# Patient Record
Sex: MALE | Race: BLACK OR AFRICAN AMERICAN | NOT HISPANIC OR LATINO
[De-identification: names, ages, dates, MRNs, and addresses within clinical notes are randomized per-mention and may not be internally consistent; named-entity substitution may affect disease eponyms.]

---

## 2021-12-23 ENCOUNTER — TRANSCRIPTION ENCOUNTER (OUTPATIENT)
Age: 71
End: 2021-12-23

## 2024-01-30 PROBLEM — Z00.00 ENCOUNTER FOR PREVENTIVE HEALTH EXAMINATION: Status: ACTIVE | Noted: 2024-01-30

## 2024-02-07 ENCOUNTER — APPOINTMENT (OUTPATIENT)
Dept: MRI IMAGING | Facility: CLINIC | Age: 74
End: 2024-02-07
Payer: MEDICARE

## 2024-02-07 ENCOUNTER — OUTPATIENT (OUTPATIENT)
Dept: OUTPATIENT SERVICES | Facility: HOSPITAL | Age: 74
LOS: 1 days | End: 2024-02-07
Payer: MEDICARE

## 2024-02-07 DIAGNOSIS — Z00.00 ENCOUNTER FOR GENERAL ADULT MEDICAL EXAMINATION WITHOUT ABNORMAL FINDINGS: ICD-10-CM

## 2024-02-07 PROCEDURE — 70551 MRI BRAIN STEM W/O DYE: CPT | Mod: MH

## 2024-02-07 PROCEDURE — 70551 MRI BRAIN STEM W/O DYE: CPT | Mod: 26,MH

## 2024-02-27 ENCOUNTER — EMERGENCY (EMERGENCY)
Facility: HOSPITAL | Age: 74
LOS: 1 days | Discharge: HOME | End: 2024-02-27
Attending: EMERGENCY MEDICINE
Payer: MEDICARE

## 2024-02-27 VITALS
HEART RATE: 70 BPM | OXYGEN SATURATION: 98 % | RESPIRATION RATE: 18 BRPM | TEMPERATURE: 98 F | SYSTOLIC BLOOD PRESSURE: 150 MMHG | DIASTOLIC BLOOD PRESSURE: 88 MMHG

## 2024-02-27 VITALS
SYSTOLIC BLOOD PRESSURE: 133 MMHG | RESPIRATION RATE: 20 BRPM | HEART RATE: 71 BPM | OXYGEN SATURATION: 100 % | WEIGHT: 182.1 LBS | TEMPERATURE: 98 F | HEIGHT: 72 IN | DIASTOLIC BLOOD PRESSURE: 74 MMHG

## 2024-02-27 LAB
ALBUMIN SERPL ELPH-MCNC: 4.6 G/DL — SIGNIFICANT CHANGE UP (ref 3.3–5)
ALP SERPL-CCNC: 95 U/L — SIGNIFICANT CHANGE UP (ref 40–120)
ALT FLD-CCNC: 22 U/L — SIGNIFICANT CHANGE UP (ref 10–45)
ANION GAP SERPL CALC-SCNC: 12 MMOL/L — SIGNIFICANT CHANGE UP (ref 5–17)
APPEARANCE UR: CLEAR — SIGNIFICANT CHANGE UP
AST SERPL-CCNC: 26 U/L — SIGNIFICANT CHANGE UP (ref 10–40)
BACTERIA # UR AUTO: NEGATIVE /HPF — SIGNIFICANT CHANGE UP
BASOPHILS # BLD AUTO: 0.09 K/UL — SIGNIFICANT CHANGE UP (ref 0–0.2)
BASOPHILS NFR BLD AUTO: 0.8 % — SIGNIFICANT CHANGE UP (ref 0–2)
BILIRUB SERPL-MCNC: 0.3 MG/DL — SIGNIFICANT CHANGE UP (ref 0.2–1.2)
BILIRUB UR-MCNC: NEGATIVE — SIGNIFICANT CHANGE UP
BUN SERPL-MCNC: 14 MG/DL — SIGNIFICANT CHANGE UP (ref 7–23)
CALCIUM SERPL-MCNC: 9.7 MG/DL — SIGNIFICANT CHANGE UP (ref 8.4–10.5)
CAST: 0 /LPF — SIGNIFICANT CHANGE UP (ref 0–4)
CHLORIDE SERPL-SCNC: 105 MMOL/L — SIGNIFICANT CHANGE UP (ref 96–108)
CO2 SERPL-SCNC: 23 MMOL/L — SIGNIFICANT CHANGE UP (ref 22–31)
COLOR SPEC: YELLOW — SIGNIFICANT CHANGE UP
CREAT SERPL-MCNC: 1.17 MG/DL — SIGNIFICANT CHANGE UP (ref 0.5–1.3)
DIFF PNL FLD: ABNORMAL
EGFR: 66 ML/MIN/1.73M2 — SIGNIFICANT CHANGE UP
EOSINOPHIL # BLD AUTO: 0.05 K/UL — SIGNIFICANT CHANGE UP (ref 0–0.5)
EOSINOPHIL NFR BLD AUTO: 0.5 % — SIGNIFICANT CHANGE UP (ref 0–6)
GLUCOSE SERPL-MCNC: 116 MG/DL — HIGH (ref 70–99)
GLUCOSE UR QL: NEGATIVE MG/DL — SIGNIFICANT CHANGE UP
HCT VFR BLD CALC: 42.6 % — SIGNIFICANT CHANGE UP (ref 39–50)
HGB BLD-MCNC: 14.2 G/DL — SIGNIFICANT CHANGE UP (ref 13–17)
IMM GRANULOCYTES NFR BLD AUTO: 0.5 % — SIGNIFICANT CHANGE UP (ref 0–0.9)
KETONES UR-MCNC: NEGATIVE MG/DL — SIGNIFICANT CHANGE UP
LEUKOCYTE ESTERASE UR-ACNC: NEGATIVE — SIGNIFICANT CHANGE UP
LIDOCAIN IGE QN: 43 U/L — SIGNIFICANT CHANGE UP (ref 7–60)
LYMPHOCYTES # BLD AUTO: 1.15 K/UL — SIGNIFICANT CHANGE UP (ref 1–3.3)
LYMPHOCYTES # BLD AUTO: 10.7 % — LOW (ref 13–44)
MCHC RBC-ENTMCNC: 29.2 PG — SIGNIFICANT CHANGE UP (ref 27–34)
MCHC RBC-ENTMCNC: 33.3 GM/DL — SIGNIFICANT CHANGE UP (ref 32–36)
MCV RBC AUTO: 87.7 FL — SIGNIFICANT CHANGE UP (ref 80–100)
MONOCYTES # BLD AUTO: 0.54 K/UL — SIGNIFICANT CHANGE UP (ref 0–0.9)
MONOCYTES NFR BLD AUTO: 5 % — SIGNIFICANT CHANGE UP (ref 2–14)
NEUTROPHILS # BLD AUTO: 8.83 K/UL — HIGH (ref 1.8–7.4)
NEUTROPHILS NFR BLD AUTO: 82.5 % — HIGH (ref 43–77)
NITRITE UR-MCNC: NEGATIVE — SIGNIFICANT CHANGE UP
NRBC # BLD: 0 /100 WBCS — SIGNIFICANT CHANGE UP (ref 0–0)
PH UR: 7.5 — SIGNIFICANT CHANGE UP (ref 5–8)
PLATELET # BLD AUTO: 208 K/UL — SIGNIFICANT CHANGE UP (ref 150–400)
POTASSIUM SERPL-MCNC: 4 MMOL/L — SIGNIFICANT CHANGE UP (ref 3.5–5.3)
POTASSIUM SERPL-SCNC: 4 MMOL/L — SIGNIFICANT CHANGE UP (ref 3.5–5.3)
PROT SERPL-MCNC: 7.4 G/DL — SIGNIFICANT CHANGE UP (ref 6–8.3)
PROT UR-MCNC: SIGNIFICANT CHANGE UP MG/DL
RBC # BLD: 4.86 M/UL — SIGNIFICANT CHANGE UP (ref 4.2–5.8)
RBC # FLD: 12.2 % — SIGNIFICANT CHANGE UP (ref 10.3–14.5)
RBC CASTS # UR COMP ASSIST: 14 /HPF — HIGH (ref 0–4)
SODIUM SERPL-SCNC: 140 MMOL/L — SIGNIFICANT CHANGE UP (ref 135–145)
SP GR SPEC: 1.02 — SIGNIFICANT CHANGE UP (ref 1–1.03)
SQUAMOUS # UR AUTO: 0 /HPF — SIGNIFICANT CHANGE UP (ref 0–5)
UROBILINOGEN FLD QL: 0.2 MG/DL — SIGNIFICANT CHANGE UP (ref 0.2–1)
WBC # BLD: 10.71 K/UL — HIGH (ref 3.8–10.5)
WBC # FLD AUTO: 10.71 K/UL — HIGH (ref 3.8–10.5)
WBC UR QL: 1 /HPF — SIGNIFICANT CHANGE UP (ref 0–5)

## 2024-02-27 PROCEDURE — 96375 TX/PRO/DX INJ NEW DRUG ADDON: CPT

## 2024-02-27 PROCEDURE — 99284 EMERGENCY DEPT VISIT MOD MDM: CPT | Mod: 25

## 2024-02-27 PROCEDURE — 85025 COMPLETE CBC W/AUTO DIFF WBC: CPT

## 2024-02-27 PROCEDURE — 87086 URINE CULTURE/COLONY COUNT: CPT

## 2024-02-27 PROCEDURE — 99285 EMERGENCY DEPT VISIT HI MDM: CPT | Mod: FS

## 2024-02-27 PROCEDURE — 81001 URINALYSIS AUTO W/SCOPE: CPT

## 2024-02-27 PROCEDURE — 96374 THER/PROPH/DIAG INJ IV PUSH: CPT

## 2024-02-27 PROCEDURE — 36415 COLL VENOUS BLD VENIPUNCTURE: CPT

## 2024-02-27 PROCEDURE — 83690 ASSAY OF LIPASE: CPT

## 2024-02-27 PROCEDURE — 80053 COMPREHEN METABOLIC PANEL: CPT

## 2024-02-27 PROCEDURE — 74176 CT ABD & PELVIS W/O CONTRAST: CPT | Mod: 26,MC

## 2024-02-27 PROCEDURE — 74176 CT ABD & PELVIS W/O CONTRAST: CPT | Mod: MC

## 2024-02-27 RX ORDER — ONDANSETRON 8 MG/1
4 TABLET, FILM COATED ORAL ONCE
Refills: 0 | Status: COMPLETED | OUTPATIENT
Start: 2024-02-27 | End: 2024-02-27

## 2024-02-27 RX ORDER — MORPHINE SULFATE 50 MG/1
4 CAPSULE, EXTENDED RELEASE ORAL ONCE
Refills: 0 | Status: DISCONTINUED | OUTPATIENT
Start: 2024-02-27 | End: 2024-02-27

## 2024-02-27 RX ORDER — SODIUM CHLORIDE 9 MG/ML
500 INJECTION INTRAMUSCULAR; INTRAVENOUS; SUBCUTANEOUS ONCE
Refills: 0 | Status: COMPLETED | OUTPATIENT
Start: 2024-02-27 | End: 2024-02-27

## 2024-02-27 RX ORDER — OXYCODONE HYDROCHLORIDE 5 MG/1
1 TABLET ORAL
Qty: 4 | Refills: 0
Start: 2024-02-27

## 2024-02-27 RX ORDER — KETOROLAC TROMETHAMINE 30 MG/ML
15 SYRINGE (ML) INJECTION ONCE
Refills: 0 | Status: DISCONTINUED | OUTPATIENT
Start: 2024-02-27 | End: 2024-02-27

## 2024-02-27 RX ADMIN — Medication 15 MILLIGRAM(S): at 18:53

## 2024-02-27 RX ADMIN — SODIUM CHLORIDE 500 MILLILITER(S): 9 INJECTION INTRAMUSCULAR; INTRAVENOUS; SUBCUTANEOUS at 17:02

## 2024-02-27 RX ADMIN — ONDANSETRON 4 MILLIGRAM(S): 8 TABLET, FILM COATED ORAL at 17:06

## 2024-02-27 RX ADMIN — MORPHINE SULFATE 4 MILLIGRAM(S): 50 CAPSULE, EXTENDED RELEASE ORAL at 17:02

## 2024-02-27 NOTE — ED PROVIDER NOTE - NSFOLLOWUPINSTRUCTIONS_ED_ALL_ED_FT
Rest and hydration. motrin 400mg every 6-8 hours for pain as needed. Take with food. Oxycodone 5 mg every 12 hours for severe pain only. DO NOT DRINK ALCOHOL OR DRIVE ON MEDICATION. follow up with urology this week. Return immediately for worsening pain, vomiting or fever.

## 2024-02-27 NOTE — ED PROVIDER NOTE - PATIENT PORTAL LINK FT
You can access the FollowMyHealth Patient Portal offered by [facility] by registering at the following website: [email or web address]. By joining [employer]’s FollowMyHealth portal, you will also be able to view your health information using other applications (apps) compatible with our system.

## 2024-02-27 NOTE — ED ADULT NURSE NOTE - OBJECTIVE STATEMENT
Patient is a 73y male presenting to the ED ambulatory from home with c/o abdominal pain. Patient A&Ox4. Patient reports developing left sided flank pain earlier today, states pain is "severe" and feels similar to a past episode of kidney stones. Pain is accompanied by nausea and "straining" with urination. Denies chest pain, shortness of breath, fever/chills, headache, hematuria. Respirations spontaneous, even, and non-labored. Abdomen soft, non-distended and non-tender upon palpation. Displays left sided CVA tenderness.

## 2024-02-27 NOTE — ED PROVIDER NOTE - OBJECTIVE STATEMENT
74 yo M with pmhx kidney stones, htn and hld presenting with left flank pain radiating to the abdomen x 1200 today. Patient states pain is stabbing 8/10. Patient states he feels some straining with urination but denies blood. patient hasn't taken anything for his symptoms. patients last kidney stone was 13 years ago and was able to pass it by himself. Patient denies chest pain, sob, cough, fever, nvd, dysuria, hematuria, tingling, numbness, weakness, and trauma

## 2024-02-27 NOTE — ED PROVIDER NOTE - CONSTITUTIONAL, MLM
normal... Well appearing, awake, alert, oriented to person, place, time/situation and mild distress due to pain

## 2024-02-27 NOTE — ED ADULT TRIAGE NOTE - HEIGHT IN FEET
6 Area H Indication Text: Tumors in this location are included in Area H (eyelids, eyebrows, nose, lips, chin, ear, pre-auricular, post-auricular, temple, genitalia, hands, feet, ankles and areola).  Tissue conservation is critical in these anatomic locations.

## 2024-02-27 NOTE — ED ADULT TRIAGE NOTE - CHIEF COMPLAINT QUOTE
left lower abdominal pain radiating to back today left lower abdominal pain radiating to back today, history of kidney stone

## 2024-02-27 NOTE — ED PROVIDER NOTE - CLINICAL SUMMARY MEDICAL DECISION MAKING FREE TEXT BOX
72 yo M with pmhx kidney stones, htn and hld presenting with left flank pain radiating to the abdomen x 1200 today. Patient with left flank pain. no abdominal tendnress. +CVAT. Will ro stone vs pyelonephritis. 74 yo M with pmhx kidney stones, htn and hld presenting with left flank pain radiating to the abdomen x 1200 today. Patient with left flank pain. no abdominal tendnress. +CVAT. Will ro stone vs pyelonephritis.    Jing: 73 year old male with kidney sstones, htn, hld, here with left flank pain radiating to abdomen today.  no abdominal tenderness. history of stones. PE: att exam: patient awake alert NAD . LUNGS CTAB no wheeze no crackle. CARD RRR no m/r/g.  Abdomen soft NT ND no rebound no guarding, + L CVA tenderness. EXT WWP no edema no calf tenderness CV 2+DP/PT bilaterally. neuro A&Ox3 gait normal.  skin warm and dry no rash  Plan: will get imaging, labs, pain control, r/o stone versus pyelo, versus uti versus msk strain.

## 2024-02-28 LAB
CULTURE RESULTS: SIGNIFICANT CHANGE UP
SPECIMEN SOURCE: SIGNIFICANT CHANGE UP

## 2025-02-18 ENCOUNTER — APPOINTMENT (OUTPATIENT)
Dept: MRI IMAGING | Facility: CLINIC | Age: 75
End: 2025-02-18
Payer: MEDICARE

## 2025-02-18 ENCOUNTER — OUTPATIENT (OUTPATIENT)
Dept: OUTPATIENT SERVICES | Facility: HOSPITAL | Age: 75
LOS: 1 days | End: 2025-02-18
Payer: MEDICARE

## 2025-02-18 DIAGNOSIS — Z00.8 ENCOUNTER FOR OTHER GENERAL EXAMINATION: ICD-10-CM

## 2025-02-18 PROCEDURE — 70551 MRI BRAIN STEM W/O DYE: CPT | Mod: 26

## 2025-02-18 PROCEDURE — 70551 MRI BRAIN STEM W/O DYE: CPT

## 2025-04-09 ENCOUNTER — TRANSCRIPTION ENCOUNTER (OUTPATIENT)
Age: 75
End: 2025-04-09

## 2025-04-09 ENCOUNTER — INPATIENT (INPATIENT)
Facility: HOSPITAL | Age: 75
LOS: 3 days | Discharge: HOME | End: 2025-04-13
Attending: HOSPITALIST | Admitting: HOSPITALIST
Payer: MEDICARE

## 2025-04-09 VITALS
OXYGEN SATURATION: 99 % | DIASTOLIC BLOOD PRESSURE: 67 MMHG | RESPIRATION RATE: 17 BRPM | HEART RATE: 87 BPM | WEIGHT: 182.1 LBS | SYSTOLIC BLOOD PRESSURE: 139 MMHG | HEIGHT: 72 IN | TEMPERATURE: 99 F

## 2025-04-09 DIAGNOSIS — R31.9 HEMATURIA, UNSPECIFIED: ICD-10-CM

## 2025-04-09 DIAGNOSIS — Z87.438 PERSONAL HISTORY OF OTHER DISEASES OF MALE GENITAL ORGANS: ICD-10-CM

## 2025-04-09 DIAGNOSIS — Z29.9 ENCOUNTER FOR PROPHYLACTIC MEASURES, UNSPECIFIED: ICD-10-CM

## 2025-04-09 DIAGNOSIS — N40.0 BENIGN PROSTATIC HYPERPLASIA WITHOUT LOWER URINARY TRACT SYMPTOMS: ICD-10-CM

## 2025-04-09 DIAGNOSIS — R78.81 BACTEREMIA: ICD-10-CM

## 2025-04-09 DIAGNOSIS — N17.9 ACUTE KIDNEY FAILURE, UNSPECIFIED: ICD-10-CM

## 2025-04-09 DIAGNOSIS — E78.5 HYPERLIPIDEMIA, UNSPECIFIED: ICD-10-CM

## 2025-04-09 DIAGNOSIS — I10 ESSENTIAL (PRIMARY) HYPERTENSION: ICD-10-CM

## 2025-04-09 LAB
ALBUMIN SERPL ELPH-MCNC: 4.2 G/DL — SIGNIFICANT CHANGE UP (ref 3.3–5)
ALP SERPL-CCNC: 66 U/L — SIGNIFICANT CHANGE UP (ref 40–120)
ALT FLD-CCNC: 17 U/L — SIGNIFICANT CHANGE UP (ref 4–41)
ANION GAP SERPL CALC-SCNC: 14 MMOL/L — SIGNIFICANT CHANGE UP (ref 7–14)
APPEARANCE UR: ABNORMAL
AST SERPL-CCNC: 25 U/L — SIGNIFICANT CHANGE UP (ref 4–40)
BACTERIA # UR AUTO: NEGATIVE /HPF — SIGNIFICANT CHANGE UP
BASOPHILS # BLD AUTO: 0.15 K/UL — SIGNIFICANT CHANGE UP (ref 0–0.2)
BASOPHILS NFR BLD AUTO: 1.7 % — SIGNIFICANT CHANGE UP (ref 0–2)
BILIRUB SERPL-MCNC: 0.8 MG/DL — SIGNIFICANT CHANGE UP (ref 0.2–1.2)
BILIRUB UR-MCNC: NEGATIVE — SIGNIFICANT CHANGE UP
BLOOD GAS VENOUS COMPREHENSIVE RESULT: SIGNIFICANT CHANGE UP
BUN SERPL-MCNC: 18 MG/DL — SIGNIFICANT CHANGE UP (ref 7–23)
CALCIUM SERPL-MCNC: 9 MG/DL — SIGNIFICANT CHANGE UP (ref 8.4–10.5)
CAST: 0 /LPF — SIGNIFICANT CHANGE UP (ref 0–4)
CHLORIDE SERPL-SCNC: 102 MMOL/L — SIGNIFICANT CHANGE UP (ref 98–107)
CO2 SERPL-SCNC: 20 MMOL/L — LOW (ref 22–31)
COLOR SPEC: ABNORMAL
CREAT SERPL-MCNC: 1.59 MG/DL — HIGH (ref 0.5–1.3)
DIFF PNL FLD: ABNORMAL
EGFR: 46 ML/MIN/1.73M2 — LOW
EGFR: 46 ML/MIN/1.73M2 — LOW
EOSINOPHIL # BLD AUTO: 0 K/UL — SIGNIFICANT CHANGE UP (ref 0–0.5)
EOSINOPHIL NFR BLD AUTO: 0 % — SIGNIFICANT CHANGE UP (ref 0–6)
GLUCOSE SERPL-MCNC: 105 MG/DL — HIGH (ref 70–99)
GLUCOSE UR QL: NEGATIVE MG/DL — SIGNIFICANT CHANGE UP
HCT VFR BLD CALC: 39.4 % — SIGNIFICANT CHANGE UP (ref 39–50)
HGB BLD-MCNC: 13 G/DL — SIGNIFICANT CHANGE UP (ref 13–17)
IANC: 8.06 K/UL — HIGH (ref 1.8–7.4)
KETONES UR-MCNC: ABNORMAL MG/DL
LEUKOCYTE ESTERASE UR-ACNC: ABNORMAL
LYMPHOCYTES # BLD AUTO: 0.47 K/UL — LOW (ref 1–3.3)
LYMPHOCYTES # BLD AUTO: 5.3 % — LOW (ref 13–44)
MANUAL SMEAR VERIFICATION: SIGNIFICANT CHANGE UP
MCHC RBC-ENTMCNC: 29.1 PG — SIGNIFICANT CHANGE UP (ref 27–34)
MCHC RBC-ENTMCNC: 33 G/DL — SIGNIFICANT CHANGE UP (ref 32–36)
MCV RBC AUTO: 88.1 FL — SIGNIFICANT CHANGE UP (ref 80–100)
MONOCYTES # BLD AUTO: 0.39 K/UL — SIGNIFICANT CHANGE UP (ref 0–0.9)
MONOCYTES NFR BLD AUTO: 4.4 % — SIGNIFICANT CHANGE UP (ref 2–14)
NEUTROPHILS # BLD AUTO: 7.81 K/UL — HIGH (ref 1.8–7.4)
NEUTROPHILS NFR BLD AUTO: 87.7 % — HIGH (ref 43–77)
NITRITE UR-MCNC: NEGATIVE — SIGNIFICANT CHANGE UP
PH UR: 6 — SIGNIFICANT CHANGE UP (ref 5–8)
PLAT MORPH BLD: NORMAL — SIGNIFICANT CHANGE UP
PLATELET # BLD AUTO: 152 K/UL — SIGNIFICANT CHANGE UP (ref 150–400)
PLATELET COUNT - ESTIMATE: NORMAL — SIGNIFICANT CHANGE UP
POTASSIUM SERPL-MCNC: 4.7 MMOL/L — SIGNIFICANT CHANGE UP (ref 3.5–5.3)
POTASSIUM SERPL-SCNC: 4.7 MMOL/L — SIGNIFICANT CHANGE UP (ref 3.5–5.3)
PROT SERPL-MCNC: 7.1 G/DL — SIGNIFICANT CHANGE UP (ref 6–8.3)
PROT UR-MCNC: 100 MG/DL
RBC # BLD: 4.47 M/UL — SIGNIFICANT CHANGE UP (ref 4.2–5.8)
RBC # FLD: 12.7 % — SIGNIFICANT CHANGE UP (ref 10.3–14.5)
RBC BLD AUTO: NORMAL — SIGNIFICANT CHANGE UP
RBC CASTS # UR COMP ASSIST: 913 /HPF — HIGH (ref 0–4)
REVIEW: SIGNIFICANT CHANGE UP
SODIUM SERPL-SCNC: 136 MMOL/L — SIGNIFICANT CHANGE UP (ref 135–145)
SP GR SPEC: 1.03 — SIGNIFICANT CHANGE UP (ref 1–1.03)
SQUAMOUS # UR AUTO: 0 /HPF — SIGNIFICANT CHANGE UP (ref 0–5)
UROBILINOGEN FLD QL: 1 MG/DL — SIGNIFICANT CHANGE UP (ref 0.2–1)
VARIANT LYMPHS # BLD: 0.9 % — SIGNIFICANT CHANGE UP (ref 0–6)
VARIANT LYMPHS NFR BLD MANUAL: 0.9 % — SIGNIFICANT CHANGE UP (ref 0–6)
WBC # BLD: 8.91 K/UL — SIGNIFICANT CHANGE UP (ref 3.8–10.5)
WBC # FLD AUTO: 8.91 K/UL — SIGNIFICANT CHANGE UP (ref 3.8–10.5)
WBC UR QL: 110 /HPF — HIGH (ref 0–5)

## 2025-04-09 PROCEDURE — 99223 1ST HOSP IP/OBS HIGH 75: CPT

## 2025-04-09 PROCEDURE — 71046 X-RAY EXAM CHEST 2 VIEWS: CPT | Mod: 26

## 2025-04-09 PROCEDURE — 99285 EMERGENCY DEPT VISIT HI MDM: CPT

## 2025-04-09 RX ORDER — ACETAMINOPHEN 500 MG/5ML
650 LIQUID (ML) ORAL EVERY 6 HOURS
Refills: 0 | Status: DISCONTINUED | OUTPATIENT
Start: 2025-04-09 | End: 2025-04-13

## 2025-04-09 RX ORDER — ASPIRIN 325 MG
81 TABLET ORAL DAILY
Refills: 0 | Status: DISCONTINUED | OUTPATIENT
Start: 2025-04-09 | End: 2025-04-13

## 2025-04-09 RX ORDER — ROSUVASTATIN CALCIUM 5 MG/1
1 TABLET, FILM COATED ORAL
Refills: 0 | DISCHARGE

## 2025-04-09 RX ORDER — PIPERACILLIN-TAZO-DEXTROSE,ISO 3.375G/5
3.38 IV SOLUTION, PIGGYBACK PREMIX FROZEN(ML) INTRAVENOUS ONCE
Refills: 0 | Status: COMPLETED | OUTPATIENT
Start: 2025-04-09 | End: 2025-04-09

## 2025-04-09 RX ORDER — PIPERACILLIN-TAZO-DEXTROSE,ISO 3.375G/5
3.38 IV SOLUTION, PIGGYBACK PREMIX FROZEN(ML) INTRAVENOUS EVERY 12 HOURS
Refills: 0 | Status: DISCONTINUED | OUTPATIENT
Start: 2025-04-09 | End: 2025-04-10

## 2025-04-09 RX ORDER — LOSARTAN POTASSIUM 100 MG/1
1 TABLET, FILM COATED ORAL
Refills: 0 | DISCHARGE

## 2025-04-09 RX ORDER — INFLUENZA A VIRUS A/IDAHO/07/2018 (H1N1) ANTIGEN (MDCK CELL DERIVED, PROPIOLACTONE INACTIVATED, INFLUENZA A VIRUS A/INDIANA/08/2018 (H3N2) ANTIGEN (MDCK CELL DERIVED, PROPIOLACTONE INACTIVATED), INFLUENZA B VIRUS B/SINGAPORE/INFTT-16-0610/2016 ANTIGEN (MDCK CELL DERIVED, PROPIOLACTONE INACTIVATED), INFLUENZA B VIRUS B/IOWA/06/2017 ANTIGEN (MDCK CELL DERIVED, PROPIOLACTONE INACTIVATED) 15; 15; 15; 15 UG/.5ML; UG/.5ML; UG/.5ML; UG/.5ML
0.5 INJECTION, SUSPENSION INTRAMUSCULAR ONCE
Refills: 0 | Status: DISCONTINUED | OUTPATIENT
Start: 2025-04-09 | End: 2025-04-13

## 2025-04-09 RX ORDER — AMLODIPINE BESYLATE 10 MG/1
1 TABLET ORAL
Refills: 0 | DISCHARGE

## 2025-04-09 RX ORDER — ASPIRIN 325 MG
1 TABLET ORAL
Refills: 0 | DISCHARGE

## 2025-04-09 RX ORDER — ROSUVASTATIN CALCIUM 5 MG/1
5 TABLET, FILM COATED ORAL AT BEDTIME
Refills: 0 | Status: DISCONTINUED | OUTPATIENT
Start: 2025-04-09 | End: 2025-04-13

## 2025-04-09 RX ADMIN — Medication 200 GRAM(S): at 15:47

## 2025-04-09 RX ADMIN — Medication 25 GRAM(S): at 23:32

## 2025-04-09 RX ADMIN — Medication 1000 MILLILITER(S): at 15:47

## 2025-04-09 RX ADMIN — Medication 650 MILLIGRAM(S): at 21:04

## 2025-04-09 NOTE — H&P ADULT - PROBLEM SELECTOR PLAN 5
Home medications: Losartan 50mg qd, Norvasc 10mg qd    - Hold Losartan iso RHIANNON  - Hold Norvasc, resume as tolerated

## 2025-04-09 NOTE — H&P ADULT - TIME BILLING
I have spent a total of 75 minutes time spent to prepare to see the patient, obtaining and reviewing history, physical examination, explaining the diagnosis, prognosis and treatment plan with the patient/family/caregiver. I also have spent the time interpreting results, and documentation as above.

## 2025-04-09 NOTE — CONSULT NOTE ADULT - SUBJECTIVE AND OBJECTIVE BOX
74M PMHx HTN and elevated PSA (4.2-4.5) s/p transrectal prostate Bx 3 days ago by private Urologist p/w vomiting and chills x1 day. Pt was given Cefdinir and Bactrim x3days, started 1 day before Prostate Bx and completed 1 day ago. Pt initially went to [facility] ED, CT A&P W were unremarkable, Cr 1.52, UA only notable for small Leuk and NO nitrate. But then was contacted by [facility] ED to go back to ED due to BCx (+) GNR. Pt report MRI Prostate was unremarkable but prostate Bx was done due to FamHx CaP in brother. Pt also had some dysuria, gross hematuria, and mild straining after Bx. Otherwise denies abd discomfort, diarrhea/constipation, weak stream/incomplete bladder emptying, incontinence/leakage.       PAST MEDICAL & SURGICAL HISTORY:  HTN    FAMILY HISTORY:  CaP in brother    SOCIAL HISTORY  Social ETOH.  NO Smoking / illict drug.     MEDICATIONS  (STANDING):    MEDICATIONS  (PRN):    Allergies  Valium (Other)    Intolerances      REVIEW OF SYSTEMS: Pertinent positives and negatives as stated in HPI, otherwise negative    Vital signs  T(C): 36.5 (25 @ 18:34), Max: 37.3 (25 @ 14:49)  HR: 67 (25 @ 18:34)  BP: 140/61 (25 @ 18:34)  SpO2: 100% (25 @ 18:34)  Wt(kg): --    PHYSICAL EXAM  GENERAL: AAOx3, NAD, well appearing, speak complete sentences  HEENT: NC/AT, EOMI, anicteric, moist mucous membrane. No facial droop. Conjunctiva clear.  NECK: supple, Full ROM  RESP: NRE, no accessory muscle use.  ABD: soft, no TTP, no Rebound tenderness/Guarding/Rigidity.  BACK: no CVAT b/l, No midline or paraspinal tenderness.   EXTREMITIES: Full ROM, no b/l LE edema      LABS:  CBC                       13.0   8.91  )-----------( 152      ( 2025 15:25 )             39.4     BMP       136  |  102  |  18  ----------------------------<  105[H]  4.7   |  20[L]  |  1.59[H]    Ca    9.0      2025 15:25    TPro  7.1  /  Alb  4.2  /  TBili  0.8  /  DBili  x   /  AST  25  /  ALT  17  /  AlkPhos  66  04-09        Urinalysis Basic - ( 2025 17:29 )    Color: Orange / Appearance: Cloudy / S.028 / pH: x  Gluc: x / Ketone: Trace mg/dL  / Bili: Negative / Urobili: 1.0 mg/dL   Blood: x / Protein: 100 mg/dL / Nitrite: Negative   Leuk Esterase: Moderate / RBC: 913 /HPF /  /HPF   Sq Epi: x / Non Sq Epi: 0 /HPF / Bacteria: Negative /HPF      Urine Cx: PENDING  Blood Cx: PENDING    Radiology: NONE.  74M PMHx HTN and elevated PSA (4.2-4.5) s/p transrectal prostate Bx 3 days ago by private Urologist p/w vomiting and chills x2 day. Pt was given Cefdinir and Bactrim x3days, started 1 day before Prostate Bx and completed 1 day ago. Pt initially went to [facility] ED yesterday, CT A&P W were unremarkable, Cr 1.52, UA only notable for small Leuk and NO nitrate. But then was contacted by [facility] ED to go back to ED due to BCx (+) GNR. Pt report MRI Prostate was unremarkable but prostate Bx was done due to FamHx CaP in brother. Pt also had some dysuria, gross hematuria, and mild straining after Bx. Otherwise denies abd discomfort, diarrhea/constipation, weak stream/incomplete bladder emptying, incontinence/leakage.       PAST MEDICAL & SURGICAL HISTORY:  HTN    FAMILY HISTORY:  CaP in brother    SOCIAL HISTORY  Social ETOH.  NO Smoking / illict drug.     MEDICATIONS  (STANDING):    MEDICATIONS  (PRN):    Allergies  Valium (Other)    Intolerances      REVIEW OF SYSTEMS: Pertinent positives and negatives as stated in HPI, otherwise negative    Vital signs  T(C): 36.5 (25 @ 18:34), Max: 37.3 (25 @ 14:49)  HR: 67 (25 @ 18:34)  BP: 140/61 (25 @ 18:34)  SpO2: 100% (25 @ 18:34)  Wt(kg): --    PHYSICAL EXAM  GENERAL: AAOx3, NAD, well appearing, speak complete sentences  HEENT: NC/AT, EOMI, anicteric, moist mucous membrane. No facial droop. Conjunctiva clear.  NECK: supple, Full ROM  RESP: NRE, no accessory muscle use.  ABD: soft, no TTP, no Rebound tenderness/Guarding/Rigidity.  BACK: no CVAT b/l, No midline or paraspinal tenderness.   EXTREMITIES: Full ROM, no b/l LE edema      LABS:  CBC                       13.0   8.91  )-----------( 152      ( 2025 15:25 )             39.4     BMP       136  |  102  |  18  ----------------------------<  105[H]  4.7   |  20[L]  |  1.59[H]    Ca    9.0      2025 15:25    TPro  7.1  /  Alb  4.2  /  TBili  0.8  /  DBili  x   /  AST  25  /  ALT  17  /  AlkPhos  66  04-09        Urinalysis Basic - ( 2025 17:29 )    Color: Orange / Appearance: Cloudy / S.028 / pH: x  Gluc: x / Ketone: Trace mg/dL  / Bili: Negative / Urobili: 1.0 mg/dL   Blood: x / Protein: 100 mg/dL / Nitrite: Negative   Leuk Esterase: Moderate / RBC: 913 /HPF /  /HPF   Sq Epi: x / Non Sq Epi: 0 /HPF / Bacteria: Negative /HPF      Urine Cx: PENDING  Blood Cx: PENDING    Radiology: NONE.

## 2025-04-09 NOTE — ED PROVIDER NOTE - CLINICAL SUMMARY MEDICAL DECISION MAKING FREE TEXT BOX
75yo M ho htn presents to ED aftrer being called by [facility] about + blood cultures. pt had prostate biopsy 3 days ago, last nigt started to have vomiting and chills. was seen in [facility] ED and had labwork done as well as CT, labs and imaging were negative except for small LE on UA . pt is currently on cefdinir and  bactrim as ppx from prostate biopsy. pt received call today that his blood culutres were + for GNR and to go tot ED. pt is slightly better today, has nausea, no vomiting, did not measure his temps but less chills. denies other complaint at this time. did not take any tylenol or motrin at this tiem    GNR bacteremia after prostate biopsy, will check labs, cultures, abx, tba

## 2025-04-09 NOTE — H&P ADULT - NSICDXPASTMEDICALHX_GEN_ALL_CORE_FT
PAST MEDICAL HISTORY:  History of BPH     History of elevated PSA     HLD (hyperlipidemia)     HTN (hypertension)

## 2025-04-09 NOTE — PATIENT PROFILE ADULT - FALL HARM RISK - HARM RISK INTERVENTIONS

## 2025-04-09 NOTE — H&P ADULT - ASSESSMENT
74M with PMH HTN, BPH, pre-DM, nephrolithiasis, recent prostate biopsy (4/7/25) presenting after blood cultures from [facility] ED visit 4/8/25 showed gram-negative bacteremia. Pt underwent prostate biopsy on 4/7 without reported complications; was started on antibiotics following procedure (Bactrim, Cefdinir). On 4/8 presenting to [facility] following development of nausea/vomiting and chills after eating salmon for lunch. Given recent procedure, CT scan of abdomen/pelvis was performed which, per documentation, showed "no acute finding, no abscess/hematoma, mild stranding compatible with recent biopsy"; UA also performed which showed small LE. Pt was discharged home from the ED and received call from [facility] informing him that blood cultures were positive for gram negative rods, prompting presentation to [facility] today.    ED Course: TMax 99.1, HR 60s-80s, BP stable (-140), 99% on RA. Labs - CBC overall unremarkable, WBC wnl; CMP with Bicarb 20, Cr 1.59 (unknown baseline), VBG unremarkable with lactate 1.9. UA  with mod LE, 110 WBC, RBCs. Repeat blood cultures drawn. S/p 2L NS, Zosyn 3.375g x1. Admitted to medicine for further management.      NOTE IS INCOMPLETE 74M with PMH HTN, BPH, elevated PSA, pre-DM, nephrolithiasis p/w hematuria, dysuria iso recent transrectal prostate biopsy (4/7/25) with gram-negative bacteremia on 4/8/25 blood cultures ([facility] ED), admitted to medicine for further management. Urology following 74M with PMH HTN, BPH, elevated PSA, pre-DM, nephrolithiasis p/w hematuria, dysuria iso recent transrectal prostate biopsy (4/7/25) with gram-negative bacteremia on 4/8/25 blood cultures (from [facility] ED), admitted to medicine for further management. Urology following

## 2025-04-09 NOTE — PHARMACOTHERAPY INTERVENTION NOTE - COMMENTS
Medication history is complete. Medication list updated in Outpatient Medication Record (OMR) via [facility] and patient who provided medication bottles.     To Note  -Patient recently completed 3 day course of Cefdinir 300mg 1 capsule twice daily and Bactrim DS 1 tablet twice daily (started 4/6/2025)    Home Medications:  aspirin 81 mg oral tablet: 1 tab(s) orally once a day  losartan 50 mg oral tablet: 1 tab(s) orally once a day   Norvasc 10 mg oral tablet: 1 tab(s) orally once a day   rosuvastatin 10 mg oral tablet: 1 tab(s) orally once a day     Please call spectra [phone] if you have any questions.

## 2025-04-09 NOTE — ED ADULT TRIAGE NOTE - CHIEF COMPLAINT QUOTE
Pt c/o vomiting, chills after prostate biopsy 3 days ago. Pt discharged from [facility] yesterday. Called back today to return for +blood cultures. PHx HTN
6

## 2025-04-09 NOTE — H&P ADULT - HISTORY OF PRESENT ILLNESS
74M with PMH HTN, BPH, pre-DM, nephrolithiasis, recent prostate biopsy (4/7/25) presenting after blood cultures from [facility] ED visit 4/8/25 showed gram-negative bacteremia. Pt underwent prostate biopsy on 4/7 without reported complications; was started on antibiotics following procedure (Bactrim, Cefdinir). On 4/8 presenting to [facility] following development of nausea/vomiting and chills after eating salmon for lunch. Given recent procedure, CT scan of abdomen/pelvis was performed which, per documentation, showed "no acute finding, no abscess/hematoma, mild stranding compatible with recent biopsy"; UA also performed which showed small LE. Pt was discharged home from the ED and received call from [facility] informing him that blood cultures were positive for gram negative rods, prompting presentation to [facility] today.    ED Course: TMax 99.1, HR 60s-80s, BP stable (-140), 99% on RA. Labs - CBC overall unremarkable, WBC wnl; CMP with Bicarb 20, Cr 1.59 (unknown baseline), VBG unremarkable with lactate 1.9. UA  with mod LE, 110 WBC, RBCs. Repeat blood cultures drawn. S/p 2L NS, Zosyn 3.375g x1. Admitted to medicine for further management. 74M with PMH HTN, BPH, pre-DM, nephrolithiasis, recent transrectal prostate biopsy (4/7/25) presenting after blood cultures from [facility] ED visit 4/8/25 showed gram-negative bacteremia. Pt underwent prostate biopsy  with Dr. Tung Gresham on 4/7 given elevated PSA and family history of prostate cancer (brother). No reported complications; was started on antibiotics pre- and post-procedure (Bactrim, Cefdinir). On 4/8 presented to [facility] following development of nausea/vomiting and chills after eating salmon for lunch. Given recent procedure, CT scan of abdomen/pelvis was performed; per report enlarged prostate, mild stranding compatible with clinical history of prostate biopsy, no collections/abscesses/hematoma, mild presacral stranding. UA also performed which showed small LE, Cr 1.52. Pt was discharged home from the ED and received call from [facility] informing him that blood cultures were positive for gram negative rods, prompting presentation to [facility] today. At present, pt endorses hematuria and mild dysuria following procedure, as well as some chills and nausea. Otherwise, he denies headache, chest pain, shortness of breath, abdominal pain, constipation/diarrhea, known sick contacts.    ED Course: TMax 102.8, HR 60s-80s, BP stable (-140), 99% on RA. Labs - CBC overall unremarkable, WBC wnl; CMP with Bicarb 20, Cr 1.59 (baseline 1.15 per Feb 2025 outpatient labs), VBG unremarkable with lactate 1.9. UA  with mod LE, 110 WBC, RBCs. Repeat blood cultures drawn. S/p 2L NS, Zosyn 3.375g x1. Admitted to medicine for further management. 74M with PMH HTN, BPH, pre-DM, nephrolithiasis, recent transrectal prostate biopsy (4/7/25) presenting after blood cultures from [facility] ED visit 4/8/25 showed gram-negative bacteremia. Pt underwent prostate biopsy  with Dr. Tung Gresham on 4/7 given elevated PSA and family history of prostate cancer (brother). No reported complications; was started on antibiotics pre- and post-procedure (Bactrim, Cefdinir). On 4/8 presented to [facility] following development of nausea/vomiting and chills after eating salmon for lunch. Given recent procedure, CT scan of abdomen/pelvis was performed; per report enlarged prostate, mild stranding compatible with clinical history of prostate biopsy, no collections/abscesses/hematoma, mild presacral stranding. UA also performed which showed small LE; Cr 1.52. Pt was discharged home from the ED and received call from [facility] informing him that blood cultures were positive for gram negative rods, prompting presentation to [facility] today. At present, pt endorses hematuria and mild dysuria following procedure, as well as some chills and nausea. Otherwise, he denies headache, chest pain, shortness of breath, abdominal pain, constipation/diarrhea, known sick contacts.    ED Course: TMax 102.8, HR 60s-80s, BP stable (-140), 99% on RA. Labs - CBC overall unremarkable, WBC wnl; CMP with Bicarb 20, Cr 1.59 (baseline 1.15 per Feb 2025 outpatient labs), VBG unremarkable with lactate 1.9. UA  with mod LE, 110 WBC, RBCs. Repeat blood cultures drawn. S/p 2L NS, Zosyn 3.375g x1. Admitted to medicine for further management.

## 2025-04-09 NOTE — ED ADULT NURSE NOTE - CHIEF COMPLAINT QUOTE
Pt c/o vomiting, chills after prostate biopsy 3 days ago. Pt discharged from [facility] yesterday. Called back today to return for +blood cultures. PHx HTN

## 2025-04-09 NOTE — H&P ADULT - PROBLEM SELECTOR PLAN 4
S/p transrectal biopsy 4/7 for elevated PSA w/ family history (brother) of prostate cancer    - F/u biopsy results outpatient  - Urology following, appreciate further recommendations S/p transrectal biopsy 4/7 for elevated PSA w/ family history (brother) of prostate cancer    - F/u biopsy results outpatient   - Urology following, appreciate further recommendations

## 2025-04-09 NOTE — H&P ADULT - NSICDXFAMILYHX_GEN_ALL_CORE_FT
FAMILY HISTORY:  Sibling  Still living? Unknown  FHx: prostate cancer, Age at diagnosis: Age Unknown

## 2025-04-09 NOTE — CONSULT NOTE ADULT - ASSESSMENT
74M PMHx HTN and elevated PSA (4.2-4.5) s/p transrectal prostate Bx 3 days ago by private Urologist p/w vomiting and chills x1 day. Pt initially went to [facility] ED, CT A&P W unremarkable, Cr 1.52, UA only notable for small Leuk and NO nitrate. But then was contacted by [facility] ED to go back to ED due to BCx (+) GNR. AFVSS. Exam unremarkable. WBC 8.91. Hct 39.4. Cr 1.59. LA 1.9. UA moderate Leuk, (-) Nitrate, 110 wbc, 913 rbc. BCX and UCX PENDING. ED Tx Zosyn.     RECOMMENDATION:   -Admit to medicine.   -F/u BCx and UCx  -Abx per primary team - started Zosyn.     DISCUSSED WITH UROLOGY ATTENDING DR. MARKS   74M PMHx HTN and elevated PSA (4.2-4.5) s/p transrectal prostate Bx 3 days ago by private Urologist p/w vomiting and chills x2 day. Pt initially went to [facility] ED yesterday, CT A&P W unremarkable, Cr 1.52, UA only notable for small Leuk and NO nitrate. But then was contacted by [facility] ED to go back to ED due to BCx (+) GNR. AFVSS. Exam unremarkable. WBC 8.91. Hct 39.4. Cr 1.59. LA 1.9. UA moderate Leuk, (-) Nitrate, 110 wbc, 913 rbc. BCX and UCX PENDING. ED Tx Zosyn.     RECOMMENDATION:   -Admit to medicine.   -F/u BCx and UCx  -Abx per primary team - started Zosyn.     DISCUSSED WITH UROLOGY ATTENDING DR. MARKS

## 2025-04-09 NOTE — ED ADULT NURSE NOTE - NSFALLUNIVINTERV_ED_ALL_ED
Bed/Stretcher in lowest position, wheels locked, appropriate side rails in place/Call bell, personal items and telephone in reach/Instruct patient to call for assistance before getting out of bed/chair/stretcher/Non-slip footwear applied when patient is off stretcher/[place] to call system/Physically safe environment - no spills, clutter or unnecessary equipment/Purposeful proactive rounding/Room/bathroom lighting operational, light cord in reach

## 2025-04-09 NOTE — ED ADULT NURSE NOTE - OBJECTIVE STATEMENT
Pt is a 74y M axo x4, ambulatory without assistive devices at baseline; presents to ED referred by MD from [facility] for nausea, chills and vomiting after being told his blood cultures were positive, s/p prostate biopsy 2 days prior to [facility] visit. Pt endorses feeling chills at this time, afebrile, vitally stable, no nausea or vomiting present, denies pain. Abdomen soft and non tender nondistended. Skin is intact, warm, and dry. PMHx HTN and HLD on oral medications and compliant.

## 2025-04-09 NOTE — H&P ADULT - ATTENDING COMMENTS
74M with PMH HTN, BPH, pre-DM, nephrolithiasis, recent transrectal prostate biopsy (4/7/25) presenting after blood cultures from [facility] ED visit 4/8/25 showed gram-negative bacteremia. Pt states he has been on oral abx since biopsy. U/A positive for pyuria, hematuria, mod leuk esterase. Pt reports hematuria, however states it is improving. Reports faxed from [facility] showing blood cultures positive for gram negative rods. Urology consulted, no acute intervention, c/w abx. Pt admitted for further management.     #Gram negative bacteremia  - likely in setting of recent prostate biopsy  - c/w IV zosyn  - f/u bcx and reports from [facility]    #RHIANNON  - Cr 1.59, baseline ~1.1. possible pre-renal vs post-obstructive  - c/w IVF  - monitor I/Os  - renal U/S if not improved  - avoid nephrotoxic agents

## 2025-04-09 NOTE — H&P ADULT - PROBLEM SELECTOR PLAN 1
Presenting as callback after [facility] blood cultures showed gram-negative bacteremia iso recent transrectal prostate biopsy (4/7/25), on Bactrim/Cefdinir pre- and post-procedure. ED visit to [facility] 4/8 for nausea/vomiting/chills- at [facility] CT scan of abdomen/pelvis read as no acute findings, no abscess/hematoma, mild stranding compatible with recent biopsy. Endorsing hematuria, mild dysuria following biopsy; suspect  source given history. On admission Tmax 102.8, WBC wnl; s/p Zosyn x1 in ED    - C/w Zosyn 3.375g q12 (dose adjusted for RHIANNON)  - F/u repeat blood cultures  - UA w/ mod LE/blood, f/u urine culture  - F/u culture data from [facility] (fax to be sent)  - Urology following, appreciate recs  - Tylenol prn for fever Presenting as callback after [facility] blood cultures showed gram-negative bacteremia iso recent transrectal prostate biopsy (4/7/25, Dr. Tung Gresham), on Bactrim/Cefdinir pre- and post-procedure. ED visit to [facility] 4/8 for nausea/vomiting/chills- at [facility] CT scan of abdomen/pelvis read as no acute findings, no abscess/hematoma, mild stranding compatible with recent biopsy. Endorsing hematuria, mild dysuria following biopsy; suspect  source given history. On admission Tmax 102.8, WBC wnl; s/p Zosyn x1 in ED    - C/w Zosyn 3.375g q12 (dose adjusted for RHIANNON)  - F/u repeat blood cultures  - UA w/ mod LE/blood, f/u urine culture  - Per report from [facility] 4/8/25: gram negative rods in aerobic/anaerobic bottles; culture in progress  - Urology following, appreciate recs  - Tylenol prn for fever

## 2025-04-09 NOTE — ED PROVIDER NOTE - OBJECTIVE STATEMENT
75yo M ho htn presents to ED aftrer being called by [facility] about + blood cultures. pt had prostate biopsy 3 days ago, last nigt started to have vomiting and chills. was seen in [facility] ED and had labwork done as well as CT, labs and imaging were negative except for small LE on UA . pt is currently on cefdinir and  bactrim as ppx from prostate biopsy. pt received call today that his blood culutres were + for GNR and to go tot ED. pt is slightly better today, has nausea, no vomiting, did not measure his temps but less chills. denies other complaint at this time. did not take any tylenol or motrin at this tiem

## 2025-04-09 NOTE — H&P ADULT - NSHPPHYSICALEXAM_GEN_ALL_CORE
GENERAL: well appearing in no acute distress, non-toxic appearing  HEAD: normocephalic, atraumatic  HEENT: normal conjunctiva, oral mucosa moist, uvula midline, no tonsilar exudates, no JVD  CARDIAC: regular rate and rhythm, normal S1S2, no appreciable murmurs  PULM: normal breath sounds, clear to ascultation bilaterally, no rales, rhonchi, wheezing  GI: Abd soft, nondistended, nontender, no rebound tenderness, no guarding, no rigidity  : no suprapubic tenderness  NEURO: no focal motor or sensory deficits, AAOx4  MSK: ROM intact, no peripheral edema, no calf tenderness b/l  SKIN: well-perfused, extremities warm, no visible rashes

## 2025-04-09 NOTE — H&P ADULT - PROBLEM SELECTOR PLAN 7
DVT ppx - hold chemical ppx iso hematuria  Diet - DASH, no lactose/dairy, no eggs, no meat  Dispo - active

## 2025-04-09 NOTE — H&P ADULT - PROBLEM SELECTOR PLAN 2
Reporting onset of hematuria since transrectal biopsy, likely iso procedure. H/H 13/39.4 on admission, hemodynamically stable    - Monitor urine   - Trend CBC  - On home aspirin (unclear indication), no other blood thinners - will c/w ASA for now

## 2025-04-09 NOTE — H&P ADULT - PROBLEM SELECTOR PLAN 3
Cr elevated to 1.59 on admission; Feb 2025 outpatient labwork with Cr 1.15 (PCP at [facility], records on phone). S/p 2L NS in ED    - C/w mIVF - LR @ 100cc/hr  - Monitor UOP  - Renally dosed Zosyn as above  - Trend SCr Cr elevated to 1.59 on admission; Feb 2025 outpatient labwork with Cr 1.15 (PCP at [facility], records on pt phone). S/p 2L NS in ED    - C/w mIVF - LR @ 100cc/hr  - Monitor UOP  - Renally dosed Zosyn as above  - Trend SCr

## 2025-04-10 ENCOUNTER — TRANSCRIPTION ENCOUNTER (OUTPATIENT)
Age: 75
End: 2025-04-10

## 2025-04-10 LAB
ALBUMIN SERPL ELPH-MCNC: 3.4 G/DL — SIGNIFICANT CHANGE UP (ref 3.3–5)
ALP SERPL-CCNC: 54 U/L — SIGNIFICANT CHANGE UP (ref 40–120)
ALT FLD-CCNC: 22 U/L — SIGNIFICANT CHANGE UP (ref 4–41)
ANION GAP SERPL CALC-SCNC: 13 MMOL/L — SIGNIFICANT CHANGE UP (ref 7–14)
AST SERPL-CCNC: 36 U/L — SIGNIFICANT CHANGE UP (ref 4–40)
BILIRUB SERPL-MCNC: 0.9 MG/DL — SIGNIFICANT CHANGE UP (ref 0.2–1.2)
BUN SERPL-MCNC: 16 MG/DL — SIGNIFICANT CHANGE UP (ref 7–23)
CALCIUM SERPL-MCNC: 8.3 MG/DL — LOW (ref 8.4–10.5)
CHLORIDE SERPL-SCNC: 102 MMOL/L — SIGNIFICANT CHANGE UP (ref 98–107)
CO2 SERPL-SCNC: 19 MMOL/L — LOW (ref 22–31)
CREAT SERPL-MCNC: 1.36 MG/DL — HIGH (ref 0.5–1.3)
CULTURE RESULTS: SIGNIFICANT CHANGE UP
EGFR: 55 ML/MIN/1.73M2 — LOW
EGFR: 55 ML/MIN/1.73M2 — LOW
GLUCOSE SERPL-MCNC: 129 MG/DL — HIGH (ref 70–99)
HCT VFR BLD CALC: 34 % — LOW (ref 39–50)
HGB BLD-MCNC: 11.5 G/DL — LOW (ref 13–17)
MAGNESIUM SERPL-MCNC: 1.8 MG/DL — SIGNIFICANT CHANGE UP (ref 1.6–2.6)
MCHC RBC-ENTMCNC: 29.3 PG — SIGNIFICANT CHANGE UP (ref 27–34)
MCHC RBC-ENTMCNC: 33.8 G/DL — SIGNIFICANT CHANGE UP (ref 32–36)
MCV RBC AUTO: 86.5 FL — SIGNIFICANT CHANGE UP (ref 80–100)
NRBC # BLD AUTO: 0 K/UL — SIGNIFICANT CHANGE UP (ref 0–0)
NRBC # FLD: 0 K/UL — SIGNIFICANT CHANGE UP (ref 0–0)
NRBC BLD AUTO-RTO: 0 /100 WBCS — SIGNIFICANT CHANGE UP (ref 0–0)
PHOSPHATE SERPL-MCNC: 1.5 MG/DL — LOW (ref 2.5–4.5)
PLATELET # BLD AUTO: 119 K/UL — LOW (ref 150–400)
POTASSIUM SERPL-MCNC: 3.8 MMOL/L — SIGNIFICANT CHANGE UP (ref 3.5–5.3)
POTASSIUM SERPL-SCNC: 3.8 MMOL/L — SIGNIFICANT CHANGE UP (ref 3.5–5.3)
PROT SERPL-MCNC: 5.7 G/DL — LOW (ref 6–8.3)
RBC # BLD: 3.93 M/UL — LOW (ref 4.2–5.8)
RBC # FLD: 12.6 % — SIGNIFICANT CHANGE UP (ref 10.3–14.5)
SODIUM SERPL-SCNC: 134 MMOL/L — LOW (ref 135–145)
SPECIMEN SOURCE: SIGNIFICANT CHANGE UP
WBC # BLD: 5.26 K/UL — SIGNIFICANT CHANGE UP (ref 3.8–10.5)
WBC # FLD AUTO: 5.26 K/UL — SIGNIFICANT CHANGE UP (ref 3.8–10.5)

## 2025-04-10 PROCEDURE — 99222 1ST HOSP IP/OBS MODERATE 55: CPT

## 2025-04-10 PROCEDURE — 93010 ELECTROCARDIOGRAM REPORT: CPT

## 2025-04-10 PROCEDURE — 99231 SBSQ HOSP IP/OBS SF/LOW 25: CPT | Mod: GC

## 2025-04-10 PROCEDURE — G0545: CPT

## 2025-04-10 PROCEDURE — 99233 SBSQ HOSP IP/OBS HIGH 50: CPT

## 2025-04-10 RX ORDER — PIPERACILLIN-TAZO-DEXTROSE,ISO 3.375G/5
3.38 IV SOLUTION, PIGGYBACK PREMIX FROZEN(ML) INTRAVENOUS EVERY 8 HOURS
Refills: 0 | Status: DISCONTINUED | OUTPATIENT
Start: 2025-04-10 | End: 2025-04-11

## 2025-04-10 RX ORDER — ONDANSETRON HCL/PF 4 MG/2 ML
4 VIAL (ML) INJECTION ONCE
Refills: 0 | Status: DISCONTINUED | OUTPATIENT
Start: 2025-04-10 | End: 2025-04-13

## 2025-04-10 RX ORDER — SOD PHOS DI, MONO/K PHOS MONO 250 MG
1 TABLET ORAL THREE TIMES A DAY
Refills: 0 | Status: COMPLETED | OUTPATIENT
Start: 2025-04-10 | End: 2025-04-10

## 2025-04-10 RX ORDER — SODIUM CHLORIDE 9 G/1000ML
1000 INJECTION, SOLUTION INTRAVENOUS
Refills: 0 | Status: DISCONTINUED | OUTPATIENT
Start: 2025-04-10 | End: 2025-04-13

## 2025-04-10 RX ADMIN — Medication 1 PACKET(S): at 21:25

## 2025-04-10 RX ADMIN — Medication 25 GRAM(S): at 21:26

## 2025-04-10 RX ADMIN — Medication 650 MILLIGRAM(S): at 21:25

## 2025-04-10 RX ADMIN — Medication 25 GRAM(S): at 13:18

## 2025-04-10 RX ADMIN — ROSUVASTATIN CALCIUM 5 MILLIGRAM(S): 5 TABLET, FILM COATED ORAL at 21:25

## 2025-04-10 RX ADMIN — Medication 81 MILLIGRAM(S): at 13:17

## 2025-04-10 RX ADMIN — Medication 1 PACKET(S): at 17:21

## 2025-04-10 RX ADMIN — Medication 650 MILLIGRAM(S): at 04:57

## 2025-04-10 RX ADMIN — Medication 1 PACKET(S): at 13:17

## 2025-04-10 RX ADMIN — Medication 650 MILLIGRAM(S): at 21:55

## 2025-04-10 NOTE — CONSULT NOTE ADULT - SUBJECTIVE AND OBJECTIVE BOX
"HPI:  74M with PMH HTN, BPH, pre-DM, nephrolithiasis, recent transrectal prostate biopsy (4/7/25) presenting after blood cultures from [facility] ED visit 4/8/25 showed gram-negative bacteremia. Pt underwent prostate biopsy  with Dr. Tung Gresham on 4/7 given elevated PSA and family history of prostate cancer (brother). No reported complications; was started on antibiotics pre- and post-procedure (Bactrim, Cefdinir). On 4/8 presented to [facility] following development of nausea/vomiting and chills after eating salmon for lunch. Given recent procedure, CT scan of abdomen/pelvis was performed; per report enlarged prostate, mild stranding compatible with clinical history of prostate biopsy, no collections/abscesses/hematoma, mild presacral stranding. UA also performed which showed small LE; Cr 1.52. Pt was discharged home from the ED and received call from [facility] informing him that blood cultures were positive for gram negative rods, prompting presentation to [facility] today. At present, pt endorses hematuria and mild dysuria following procedure, as well as some chills and nausea. Otherwise, he denies headache, chest pain, shortness of breath, abdominal pain, constipation/diarrhea, known sick contacts.    ED Course: TMax 102.8, HR 60s-80s, BP stable (-140), 99% on RA. Labs - CBC overall unremarkable, WBC wnl; CMP with Bicarb 20, Cr 1.59 (baseline 1.15 per Feb 2025 outpatient labs), VBG unremarkable with lactate 1.9. UA  with mod LE, 110 WBC, RBCs. Repeat blood cultures drawn. S/p 2L NS, Zosyn 3.375g x1. Admitted to medicine for further management. (09 Apr 2025 20:14)"    Above reviewed. 73 yo M HTN, DM, nephrolithiasis, transrectal biopsy with positive BCXs  Patient recently had prostate biopsy, after procedure developed fevers, chills, went to [facility]  Was sent home, but then cultures found positive for GNR  Called back to hospital to presented to [facility]  Patient feels somewhat improved but still feels weak  ID called for further eval    PAST MEDICAL & SURGICAL HISTORY:  History of BPH      HTN (hypertension)      HLD (hyperlipidemia)      History of elevated PSA    Allergies    Valium (Other)  lactose (Unknown)    Intolerances    ANTIMICROBIALS:  piperacillin/tazobactam IVPB.. 3.375 every 8 hours    OTHER MEDS:  acetaminophen     Tablet .. 650 milliGRAM(s) Oral every 6 hours PRN  aspirin  chewable 81 milliGRAM(s) Oral daily  influenza  Vaccine (HIGH DOSE) 0.5 milliLiter(s) IntraMuscular once  lactated ringers. 1000 milliLiter(s) IV Continuous <Continuous>  potassium phosphate / sodium phosphate Powder (PHOS-NaK) 1 Packet(s) Oral three times a day  rosuvastatin 5 milliGRAM(s) Oral at bedtime    SOCIAL HISTORY: No tobacco, no alcohol, no illicit drugs    FAMILY HISTORY:  FHx: prostate cancer (Sibling)    Drug Dosing Weight  Height (cm): 182.9 (09 Apr 2025 22:00)  Weight (kg): 82.7 (09 Apr 2025 22:00)  BMI (kg/m2): 24.7 (09 Apr 2025 22:00)  BSA (m2): 2.05 (09 Apr 2025 22:00)    PE:    Vital Signs Last 24 Hrs  T(C): 36.9 (10 Apr 2025 14:00), Max: 39.6 (10 Apr 2025 05:00)  T(F): 98.4 (10 Apr 2025 14:00), Max: 103.2 (10 Apr 2025 05:00)  HR: 67 (10 Apr 2025 14:00) (64 - 92)  BP: 111/64 (10 Apr 2025 14:00) (109/61 - 140/61)  RR: 16 (10 Apr 2025 14:00) (16 - 17)  SpO2: 100% (10 Apr 2025 14:00) (96% - 100%)    Gen: AOx3, NAD, non-toxic, pleasant  CV: S1+S2 normal, nontachycardic  Resp: Clear bilat, no resp distress, no crackles/wheezes  Abd: Soft, nontender, +BS  Ext: No LE edema, no wounds  : No Neves  IV/Skin: No thrombophlebitis  Msk: No low back pain, no arthralgias, no joint swelling  Neuro: No sensory deficits, no motor deficits    LABS:                        11.5   5.26  )-----------( 119      ( 10 Apr 2025 06:10 )             34.0     04-10    134[L]  |  102  |  16  ----------------------------<  129[H]  3.8   |  19[L]  |  1.36[H]    Ca    8.3[L]      10 Apr 2025 06:10  Phos  1.5     04-10  Mg     1.80     04-10    TPro  5.7[L]  /  Alb  3.4  /  TBili  0.9  /  DBili  x   /  AST  36  /  ALT  22  /  AlkPhos  54  04-10    Urinalysis Basic - ( 10 Apr 2025 06:10 )    Color: x / Appearance: x / SG: x / pH: x  Gluc: 129 mg/dL / Ketone: x  / Bili: x / Urobili: x   Blood: x / Protein: x / Nitrite: x   Leuk Esterase: x / RBC: x / WBC x   Sq Epi: x / Non Sq Epi: x / Bacteria: x    MICROBIOLOGY:    UA+  RADIOLOGY:      CXR    FINDINGS:  The heart size is normal.  The lungs are clear.  There is no pneumothorax or pleural effusion.    IMPRESSION:  Clear lungs.

## 2025-04-10 NOTE — PROGRESS NOTE ADULT - PROBLEM SELECTOR PLAN 7
DVT ppx - hold chemical ppx iso hematuria  Diet - DASH, no lactose/dairy, no eggs, no meat  Dispo - active DVT ppx - hold chemical ppx iso hematuria  Diet - DASH, no lactose/dairy, no eggs, no meat  Dispo - active  plan of care d/w pt and wife at bedside

## 2025-04-10 NOTE — CONSULT NOTE ADULT - SUBJECTIVE AND OBJECTIVE BOX
Patient is a 74y old  Male who presents with a chief complaint of gram negative bacteremia (10 Apr 2025 16:26)      HPI:  74M with PMH HTN, BPH, pre-DM, nephrolithiasis, recent transrectal prostate biopsy (4/7/25) presenting after blood cultures from [facility] ED visit 4/8/25 showed gram-negative bacteremia. Pt underwent prostate biopsy  with Dr. Tung Gresham on 4/7 given elevated PSA and family history of prostate cancer (brother). No reported complications; was started on antibiotics pre- and post-procedure (Bactrim, Cefdinir). On 4/8 presented to [facility] following development of nausea/vomiting and chills after eating salmon for lunch. Given recent procedure, CT scan of abdomen/pelvis was performed; per report enlarged prostate, mild stranding compatible with clinical history of prostate biopsy, no collections/abscesses/hematoma, mild presacral stranding. UA also performed which showed small LE; Cr 1.52. Pt was discharged home from the ED and received call from [facility] informing him that blood cultures were positive for gram negative rods, prompting presentation to [facility] today. At present, pt endorses hematuria and mild dysuria following procedure, as well as some chills and nausea. Otherwise, he denies headache, chest pain, shortness of breath, abdominal pain, constipation/diarrhea, known sick contacts.    ED Course: TMax 102.8, HR 60s-80s, BP stable (-140), 99% on RA. Labs - CBC overall unremarkable, WBC wnl; CMP with Bicarb 20, Cr 1.59 (baseline 1.15 per Feb 2025 outpatient labs), VBG unremarkable with lactate 1.9. UA  with mod LE, 110 WBC, RBCs. Repeat blood cultures drawn. S/p 2L NS, Zosyn 3.375g x1. Admitted to medicine for further management. (09 Apr 2025 20:14)    Feeling better now, voiding without difficulty.    PAST MEDICAL & SURGICAL HISTORY:  History of BPH      HTN (hypertension)      HLD (hyperlipidemia)      MEDICATIONS  (STANDING):  aspirin  chewable 81 milliGRAM(s) Oral daily  influenza  Vaccine (HIGH DOSE) 0.5 milliLiter(s) IntraMuscular once  lactated ringers. 1000 milliLiter(s) (100 mL/Hr) IV Continuous <Continuous>  piperacillin/tazobactam IVPB.. 3.375 Gram(s) IV Intermittent every 8 hours  potassium phosphate / sodium phosphate Powder (PHOS-NaK) 1 Packet(s) Oral three times a day  rosuvastatin 5 milliGRAM(s) Oral at bedtime    MEDICATIONS  (PRN):  acetaminophen     Tablet .. 650 milliGRAM(s) Oral every 6 hours PRN Temp greater or equal to 38C (100.4F), Mild Pain (1 - 3)  ondansetron Injectable 4 milliGRAM(s) IV Push once PRN Nausea and/or Vomiting      Allergies    Valium (Other)  lactose (Unknown)    Intolerances        SOCIAL HISTORY: No illicit drug use    FAMILY HISTORY:  FHx: prostate cancer (Sibling)        Vital Signs Last 24 Hrs  T(C): 37.1 (10 Apr 2025 18:00), Max: 39.6 (10 Apr 2025 05:00)  T(F): 98.8 (10 Apr 2025 18:00), Max: 103.2 (10 Apr 2025 05:00)  HR: 77 (10 Apr 2025 18:00) (64 - 92)  BP: 126/57 (10 Apr 2025 18:00) (109/61 - 140/61)  BP(mean): --  RR: 18 (10 Apr 2025 18:00) (16 - 18)  SpO2: 99% (10 Apr 2025 18:00) (96% - 100%)    Parameters below as of 10 Apr 2025 18:00  Patient On (Oxygen Delivery Method): room air        PHYSICAL EXAM:    Constitutional: NAD, well-developed  Neck: No JVD  Back: No CVA tenderness  Respiratory: No accessory respiratory muscle use  Abd: Soft, NT/ND      I&O's Summary    09 Apr 2025 07:01  -  10 Apr 2025 07:00  --------------------------------------------------------  IN: 840 mL / OUT: 400 mL / NET: 440 mL    10 Apr 2025 07:01  -  10 Apr 2025 17:47  --------------------------------------------------------  IN: 580 mL / OUT: 0 mL / NET: 580 mL        LABS:                        11.5   5.26  )-----------( 119      ( 10 Apr 2025 06:10 )             34.0     04-10    134[L]  |  102  |  16  ----------------------------<  129[H]  3.8   |  19[L]  |  1.36[H]    Ca    8.3[L]      10 Apr 2025 06:10  Phos  1.5     04-10  Mg     1.80     04-10    TPro  5.7[L]  /  Alb  3.4  /  TBili  0.9  /  DBili  x   /  AST  36  /  ALT  22  /  AlkPhos  54  04-10      Urinalysis Basic - ( 10 Apr 2025 06:10 )    Color: x / Appearance: x / SG: x / pH: x  Gluc: 129 mg/dL / Ketone: x  / Bili: x / Urobili: x   Blood: x / Protein: x / Nitrite: x   Leuk Esterase: x / RBC: x / WBC x   Sq Epi: x / Non Sq Epi: x / Bacteria: x

## 2025-04-10 NOTE — PROGRESS NOTE ADULT - PROBLEM SELECTOR PLAN 1
Presenting as callback after [facility] blood cultures showed gram-negative bacteremia iso recent transrectal prostate biopsy (4/7/25, Dr. Tung Gresham), on Bactrim/Cefdinir pre- and post-procedure. ED visit to [facility] 4/8 for nausea/vomiting/chills- at [facility] CT scan of abdomen/pelvis read as no acute findings, no abscess/hematoma, mild stranding compatible with recent biopsy. Endorsing hematuria, mild dysuria following biopsy; suspect  source given history. On admission Tmax 102.8, WBC wnl; s/p Zosyn x1 in ED    - C/w Zosyn 3.375g q12 (dose adjusted for RHIANNON)  - F/u repeat blood cultures  - UA w/ mod LE/blood, f/u urine culture  - Per report from [facility] 4/8/25: gram negative rods in aerobic/anaerobic bottles; culture in progress  - Urology following, appreciate recs  - Tylenol prn for fever Presenting as callback after [facility] blood cultures showed gram-negative bacteremia iso recent transrectal prostate biopsy (4/7/25, Dr. Tung Gresham), on Bactrim/Cefdinir pre- and post-procedure. ED visit to [facility] 4/8 for nausea/vomiting/chills- at [facility] CT scan of abdomen/pelvis read as no acute findings, no abscess/hematoma, mild stranding compatible with recent biopsy. Endorsing hematuria, mild dysuria following biopsy; suspect  source given history. On admission Tmax 102.8, WBC wnl; s/p Zosyn x1 in ED    - C/w Zosyn 3.375g q12 (dose adjusted for RHIANNON)  - F/u repeat blood cultures  - UA w/ mod LE/blood, f/u urine culture  - Per report from [facility] 4/8/25: gram negative rods in aerobic/anaerobic bottles; culture in progress  - Urology following, appreciate recs  - Tylenol prn for fever  -ID eval requested for  abx management ,

## 2025-04-10 NOTE — PROGRESS NOTE ADULT - SUBJECTIVE AND OBJECTIVE BOX
Patient is a 74y old  Male who presents with a chief complaint of gram negative bacteremia (10 Apr 2025 07:39)      SUBJECTIVE / OVERNIGHT EVENTS:    MEDICATIONS  (STANDING):  aspirin  chewable 81 milliGRAM(s) Oral daily  influenza  Vaccine (HIGH DOSE) 0.5 milliLiter(s) IntraMuscular once  lactated ringers. 1000 milliLiter(s) (100 mL/Hr) IV Continuous <Continuous>  piperacillin/tazobactam IVPB.. 3.375 Gram(s) IV Intermittent every 12 hours  potassium phosphate / sodium phosphate Powder (PHOS-NaK) 1 Packet(s) Oral three times a day  rosuvastatin 5 milliGRAM(s) Oral at bedtime    MEDICATIONS  (PRN):  acetaminophen     Tablet .. 650 milliGRAM(s) Oral every 6 hours PRN Temp greater or equal to 38C (100.4F), Mild Pain (1 - 3)      Vital Signs Last 24 Hrs  T(C): 37.3 (10 Apr 2025 07:15), Max: 39.6 (10 Apr 2025 05:00)  T(F): 99.1 (10 Apr 2025 07:15), Max: 103.2 (10 Apr 2025 05:00)  HR: 88 (10 Apr 2025 05:00) (67 - 92)  BP: 129/66 (10 Apr 2025 05:00) (120/64 - 140/61)  BP(mean): --  RR: 16 (10 Apr 2025 05:00) (16 - 17)  SpO2: 100% (10 Apr 2025 05:00) (96% - 100%)    Parameters below as of 10 Apr 2025 05:00  Patient On (Oxygen Delivery Method): room air      CAPILLARY BLOOD GLUCOSE        I&O's Summary    09 Apr 2025 07:01  -  10 Apr 2025 07:00  --------------------------------------------------------  IN: 840 mL / OUT: 400 mL / NET: 440 mL        PHYSICAL EXAM:  GENERAL: NAD, well-developed  HEAD:  Atraumatic, Normocephalic  EYES: EOMI, PERRLA, conjunctiva and sclera clear  NECK: Supple, No JVD  CHEST/LUNG: Clear to auscultation bilaterally; No wheeze  HEART: Regular rate and rhythm; No murmurs, rubs, or gallops  ABDOMEN: Soft, Nontender, Nondistended; Bowel sounds present  EXTREMITIES:  2+ Peripheral Pulses, No clubbing, cyanosis, or edema  PSYCH: AAOx3  NEUROLOGY: non-focal  SKIN: No rashes or lesions    LABS:                        11.5   5.26  )-----------( 119      ( 10 Apr 2025 06:10 )             34.0     04-10    134[L]  |  102  |  16  ----------------------------<  129[H]  3.8   |  19[L]  |  1.36[H]    Ca    8.3[L]      10 Apr 2025 06:10  Phos  1.5     04-10  Mg     1.80     04-10    TPro  5.7[L]  /  Alb  3.4  /  TBili  0.9  /  DBili  x   /  AST  36  /  ALT  22  /  AlkPhos  54  04-10          Urinalysis Basic - ( 10 Apr 2025 06:10 )    Color: x / Appearance: x / SG: x / pH: x  Gluc: 129 mg/dL / Ketone: x  / Bili: x / Urobili: x   Blood: x / Protein: x / Nitrite: x   Leuk Esterase: x / RBC: x / WBC x   Sq Epi: x / Non Sq Epi: x / Bacteria: x        RADIOLOGY & ADDITIONAL TESTS:    Imaging Personally Reviewed:    Consultant(s) Notes Reviewed:      Care Discussed with Consultants/Other Providers:   Patient is a 74y old  Male who presents with a chief complaint of gram negative bacteremia (10 Apr 2025 07:39)      SUBJECTIVE / OVERNIGHT EVENTS: patient seen and examined by bedside, pt alert, awake, has been febrile ,pt feeling fatigued but  denies headache, dizziness, SOB, CP, Palpitations , N/V/D, abdominal pain      MEDICATIONS  (STANDING):  aspirin  chewable 81 milliGRAM(s) Oral daily  influenza  Vaccine (HIGH DOSE) 0.5 milliLiter(s) IntraMuscular once  lactated ringers. 1000 milliLiter(s) (100 mL/Hr) IV Continuous <Continuous>  piperacillin/tazobactam IVPB.. 3.375 Gram(s) IV Intermittent every 12 hours  potassium phosphate / sodium phosphate Powder (PHOS-NaK) 1 Packet(s) Oral three times a day  rosuvastatin 5 milliGRAM(s) Oral at bedtime    MEDICATIONS  (PRN):  acetaminophen     Tablet .. 650 milliGRAM(s) Oral every 6 hours PRN Temp greater or equal to 38C (100.4F), Mild Pain (1 - 3)      Vital Signs Last 24 Hrs  T(C): 37.3 (10 Apr 2025 07:15), Max: 39.6 (10 Apr 2025 05:00)  T(F): 99.1 (10 Apr 2025 07:15), Max: 103.2 (10 Apr 2025 05:00)  HR: 88 (10 Apr 2025 05:00) (67 - 92)  BP: 129/66 (10 Apr 2025 05:00) (120/64 - 140/61)  BP(mean): --  RR: 16 (10 Apr 2025 05:00) (16 - 17)  SpO2: 100% (10 Apr 2025 05:00) (96% - 100%)    Parameters below as of 10 Apr 2025 05:00  Patient On (Oxygen Delivery Method): room air      CAPILLARY BLOOD GLUCOSE        I&O's Summary    09 Apr 2025 07:01  -  10 Apr 2025 07:00  --------------------------------------------------------  IN: 840 mL / OUT: 400 mL / NET: 440 mL        PHYSICAL EXAM:  GENERAL: NAD, well-developed, appears exhausted   HEAD:  Atraumatic, Normocephalic  EYES: EOMI, PERRLA, conjunctiva and sclera clear  NECK: Supple, No JVD  CHEST/LUNG: Clear to auscultation bilaterally; No wheeze  HEART: Regular rate and rhythm; No murmurs, rubs, or gallops  ABDOMEN: Soft, Nontender, Nondistended; Bowel sounds present, no CVA tenderness   EXTREMITIES:  2+ Peripheral Pulses, No clubbing, cyanosis, or edema  PSYCH: AAOx3  NEUROLOGY: non-focal  SKIN: No rashes or lesions    LABS:                        11.5   5.26  )-----------( 119      ( 10 Apr 2025 06:10 )             34.0     04-10    134[L]  |  102  |  16  ----------------------------<  129[H]  3.8   |  19[L]  |  1.36[H]    Ca    8.3[L]      10 Apr 2025 06:10  Phos  1.5     04-10  Mg     1.80     04-10    TPro  5.7[L]  /  Alb  3.4  /  TBili  0.9  /  DBili  x   /  AST  36  /  ALT  22  /  AlkPhos  54  04-10          Urinalysis Basic - ( 10 Apr 2025 06:10 )    Color: x / Appearance: x / SG: x / pH: x  Gluc: 129 mg/dL / Ketone: x  / Bili: x / Urobili: x   Blood: x / Protein: x / Nitrite: x   Leuk Esterase: x / RBC: x / WBC x   Sq Epi: x / Non Sq Epi: x / Bacteria: x        RADIOLOGY & ADDITIONAL TESTS:    Imaging Personally Reviewed:    Consultant(s) Notes Reviewed:  urology     Care Discussed with Consultants/Other Providers: ID

## 2025-04-10 NOTE — PROGRESS NOTE ADULT - SUBJECTIVE AND OBJECTIVE BOX
Subjective  resting. feeling okay     Objective    Vital signs  T(F): , Max: 103.2 (04-10-25 @ 05:00)  HR: 88 (04-10-25 @ 05:00)  BP: 129/66 (04-10-25 @ 05:00)  SpO2: 100% (04-10-25 @ 05:00)  Wt(kg): --    Output     OUT:    Voided (mL): 400 mL  Total OUT: 400 mL    Total NET: -400 mL          Gen: NAD  Abd: soft, nontender, nondistended  : voiding     Labs      04-09 @ 15:25    WBC 8.91  / Hct 39.4  / SCr 1.59         Urinalysis with Rflx Culture (collected 04-09-25 @ 17:29)        Urine Cx: ?  Blood Cx: ?    Imaging

## 2025-04-10 NOTE — CHART NOTE - NSCHARTNOTEFT_GEN_A_CORE
Patient is a 74M PMHx HTN and elevated PSA (4.2-4.5) s/p transrectal prostate Bx 3 days ago by private Urologist p/w vomiting and chills x2 day. Pt initially went to [facility] ED, CT A&P unremarkable, Cr 1.52, UA only notable for small Leuk and no nitrate. But then was contacted by [facility] ED to go back to ED due to BCx (+) GNR.    Patient with fever in PM 4/10. Of note, patient, Patient is a 74M PMHx HTN and elevated PSA (4.2-4.5) s/p transrectal prostate Bx 3 days ago by private Urologist p/w vomiting and chills x2 day. Pt initially went to [facility] ED, CT A&P unremarkable, Cr 1.52, UA only notable for small Leuk and no nitrate. But then was contacted by [facility] ED to go back to ED due to BCx (+) GNR. Patient with fever in PM 4/10. Tylenol 650mg PO administered. RN aware to repeat temperature in an hour. Of note, patient also spiked fever in AM of 4/10. Septic work up already ongoing since 4/9. RVP now added. Will continue to monitor patient overnight.     Overnight ACP Coverage,   Sandra Blanc NP  [id number]

## 2025-04-10 NOTE — CONSULT NOTE ADULT - ASSESSMENT
Gram Negative Bacteremia after prostate biopsy  -Follow up cultures  -Continue antibiotics as per ID  -No  interventions warranted at this time.  -Outpt follow-up for biopsy results.    Amos Jackson MD  [facility] - [facility], and [place]

## 2025-04-10 NOTE — PROGRESS NOTE ADULT - PROBLEM SELECTOR PLAN 3
Cr elevated to 1.59 on admission; Feb 2025 outpatient labwork with Cr 1.15 (PCP at [facility], records on pt phone). S/p 2L NS in ED    - C/w mIVF - LR @ 100cc/hr  - Monitor UOP  - Renally dosed Zosyn as above  - Trend SCr Cr elevated to 1.59 on admission; Feb 2025 outpatient labwork with Cr 1.15 (PCP at [facility], records on pt phone). S/p 2L NS in ED    - C/w mIVF - LR @ 100cc/hr  - Monitor UOP  - Renally dosed Zosyn as above  - cr trending down, Trend SCr  - avoid nephrotoxics

## 2025-04-11 LAB
ADD ON TEST-SPECIMEN IN LAB: SIGNIFICANT CHANGE UP
ALBUMIN SERPL ELPH-MCNC: 3.3 G/DL — SIGNIFICANT CHANGE UP (ref 3.3–5)
ALP SERPL-CCNC: 58 U/L — SIGNIFICANT CHANGE UP (ref 40–120)
ALT FLD-CCNC: 43 U/L — HIGH (ref 4–41)
ANION GAP SERPL CALC-SCNC: 10 MMOL/L — SIGNIFICANT CHANGE UP (ref 7–14)
AST SERPL-CCNC: 63 U/L — HIGH (ref 4–40)
BASOPHILS # BLD AUTO: 0.09 K/UL — SIGNIFICANT CHANGE UP (ref 0–0.2)
BASOPHILS NFR BLD AUTO: 2.6 % — HIGH (ref 0–2)
BILIRUB SERPL-MCNC: 0.9 MG/DL — SIGNIFICANT CHANGE UP (ref 0.2–1.2)
BUN SERPL-MCNC: 14 MG/DL — SIGNIFICANT CHANGE UP (ref 7–23)
BURR CELLS BLD QL SMEAR: PRESENT — SIGNIFICANT CHANGE UP
CALCIUM SERPL-MCNC: 8.2 MG/DL — LOW (ref 8.4–10.5)
CHLORIDE SERPL-SCNC: 105 MMOL/L — SIGNIFICANT CHANGE UP (ref 98–107)
CO2 SERPL-SCNC: 20 MMOL/L — LOW (ref 22–31)
CREAT SERPL-MCNC: 1.2 MG/DL — SIGNIFICANT CHANGE UP (ref 0.5–1.3)
EGFR: 64 ML/MIN/1.73M2 — SIGNIFICANT CHANGE UP
EGFR: 64 ML/MIN/1.73M2 — SIGNIFICANT CHANGE UP
EOSINOPHIL # BLD AUTO: 0 K/UL — SIGNIFICANT CHANGE UP (ref 0–0.5)
EOSINOPHIL NFR BLD AUTO: 0 % — SIGNIFICANT CHANGE UP (ref 0–6)
FLUAV AG NPH QL: SIGNIFICANT CHANGE UP
FLUBV AG NPH QL: SIGNIFICANT CHANGE UP
GIANT PLATELETS BLD QL SMEAR: PRESENT — SIGNIFICANT CHANGE UP
GLUCOSE SERPL-MCNC: 99 MG/DL — SIGNIFICANT CHANGE UP (ref 70–99)
HCT VFR BLD CALC: 36 % — LOW (ref 39–50)
HGB BLD-MCNC: 12.2 G/DL — LOW (ref 13–17)
IANC: 2.59 K/UL — SIGNIFICANT CHANGE UP (ref 1.8–7.4)
LYMPHOCYTES # BLD AUTO: 0.41 K/UL — LOW (ref 1–3.3)
LYMPHOCYTES # BLD AUTO: 12.3 % — LOW (ref 13–44)
MCHC RBC-ENTMCNC: 29 PG — SIGNIFICANT CHANGE UP (ref 27–34)
MCHC RBC-ENTMCNC: 33.9 G/DL — SIGNIFICANT CHANGE UP (ref 32–36)
MCV RBC AUTO: 85.7 FL — SIGNIFICANT CHANGE UP (ref 80–100)
MONOCYTES # BLD AUTO: 0.09 K/UL — SIGNIFICANT CHANGE UP (ref 0–0.9)
MONOCYTES NFR BLD AUTO: 2.6 % — SIGNIFICANT CHANGE UP (ref 2–14)
NEUTROPHILS # BLD AUTO: 2.7 K/UL — SIGNIFICANT CHANGE UP (ref 1.8–7.4)
NEUTROPHILS NFR BLD AUTO: 72.8 % — SIGNIFICANT CHANGE UP (ref 43–77)
NEUTS BAND # BLD: 8.8 % — HIGH (ref 0–6)
NEUTS BAND NFR BLD: 8.8 % — HIGH (ref 0–6)
PHOSPHATE SERPL-MCNC: 3.4 MG/DL — SIGNIFICANT CHANGE UP (ref 2.5–4.5)
PLAT MORPH BLD: NORMAL — SIGNIFICANT CHANGE UP
PLATELET # BLD AUTO: 134 K/UL — LOW (ref 150–400)
PLATELET COUNT - ESTIMATE: ABNORMAL
POIKILOCYTOSIS BLD QL AUTO: SLIGHT — SIGNIFICANT CHANGE UP
POTASSIUM SERPL-MCNC: 4.1 MMOL/L — SIGNIFICANT CHANGE UP (ref 3.5–5.3)
POTASSIUM SERPL-SCNC: 4.1 MMOL/L — SIGNIFICANT CHANGE UP (ref 3.5–5.3)
PROT SERPL-MCNC: 5.8 G/DL — LOW (ref 6–8.3)
RBC # BLD: 4.2 M/UL — SIGNIFICANT CHANGE UP (ref 4.2–5.8)
RBC # FLD: 12.8 % — SIGNIFICANT CHANGE UP (ref 10.3–14.5)
RBC BLD AUTO: NORMAL — SIGNIFICANT CHANGE UP
RSV RNA NPH QL NAA+NON-PROBE: SIGNIFICANT CHANGE UP
SARS-COV-2 RNA SPEC QL NAA+PROBE: SIGNIFICANT CHANGE UP
SODIUM SERPL-SCNC: 135 MMOL/L — SIGNIFICANT CHANGE UP (ref 135–145)
SOURCE RESPIRATORY: SIGNIFICANT CHANGE UP
VARIANT LYMPHS # BLD: 0.9 % — SIGNIFICANT CHANGE UP (ref 0–6)
VARIANT LYMPHS NFR BLD MANUAL: 0.9 % — SIGNIFICANT CHANGE UP (ref 0–6)
WBC # BLD: 3.31 K/UL — LOW (ref 3.8–10.5)
WBC # FLD AUTO: 3.31 K/UL — LOW (ref 3.8–10.5)

## 2025-04-11 PROCEDURE — 99232 SBSQ HOSP IP/OBS MODERATE 35: CPT

## 2025-04-11 PROCEDURE — 74177 CT ABD & PELVIS W/CONTRAST: CPT | Mod: 26

## 2025-04-11 PROCEDURE — G0545: CPT

## 2025-04-11 PROCEDURE — 99233 SBSQ HOSP IP/OBS HIGH 50: CPT

## 2025-04-11 RX ORDER — POLYETHYLENE GLYCOL 3350 17 G/17G
17 POWDER, FOR SOLUTION ORAL DAILY
Refills: 0 | Status: DISCONTINUED | OUTPATIENT
Start: 2025-04-11 | End: 2025-04-13

## 2025-04-11 RX ORDER — SENNA 187 MG
2 TABLET ORAL AT BEDTIME
Refills: 0 | Status: DISCONTINUED | OUTPATIENT
Start: 2025-04-11 | End: 2025-04-13

## 2025-04-11 RX ORDER — ERTAPENEM SODIUM 1 G/1
1000 INJECTION, POWDER, LYOPHILIZED, FOR SOLUTION INTRAMUSCULAR; INTRAVENOUS EVERY 24 HOURS
Refills: 0 | Status: DISCONTINUED | OUTPATIENT
Start: 2025-04-11 | End: 2025-04-13

## 2025-04-11 RX ORDER — ACETAMINOPHEN 500 MG/5ML
1000 LIQUID (ML) ORAL ONCE
Refills: 0 | Status: COMPLETED | OUTPATIENT
Start: 2025-04-11 | End: 2025-04-11

## 2025-04-11 RX ADMIN — Medication 81 MILLIGRAM(S): at 11:26

## 2025-04-11 RX ADMIN — Medication 400 MILLIGRAM(S): at 02:16

## 2025-04-11 RX ADMIN — Medication 650 MILLIGRAM(S): at 22:15

## 2025-04-11 RX ADMIN — Medication 650 MILLIGRAM(S): at 10:13

## 2025-04-11 RX ADMIN — ERTAPENEM SODIUM 100 MILLIGRAM(S): 1 INJECTION, POWDER, LYOPHILIZED, FOR SOLUTION INTRAMUSCULAR; INTRAVENOUS at 15:34

## 2025-04-11 RX ADMIN — Medication 650 MILLIGRAM(S): at 11:07

## 2025-04-11 RX ADMIN — Medication 25 GRAM(S): at 06:05

## 2025-04-11 RX ADMIN — Medication 1000 MILLIGRAM(S): at 02:31

## 2025-04-11 RX ADMIN — ROSUVASTATIN CALCIUM 5 MILLIGRAM(S): 5 TABLET, FILM COATED ORAL at 21:18

## 2025-04-11 NOTE — PROGRESS NOTE ADULT - SUBJECTIVE AND OBJECTIVE BOX
Patient is a 74y old  Male who presents with a chief complaint of gram negative bacteremia (10 Apr 2025 17:46)      SUBJECTIVE / OVERNIGHT EVENTS:    MEDICATIONS  (STANDING):  aspirin  chewable 81 milliGRAM(s) Oral daily  influenza  Vaccine (HIGH DOSE) 0.5 milliLiter(s) IntraMuscular once  lactated ringers. 1000 milliLiter(s) (100 mL/Hr) IV Continuous <Continuous>  piperacillin/tazobactam IVPB.. 3.375 Gram(s) IV Intermittent every 8 hours  rosuvastatin 5 milliGRAM(s) Oral at bedtime    MEDICATIONS  (PRN):  acetaminophen     Tablet .. 650 milliGRAM(s) Oral every 6 hours PRN Temp greater or equal to 38C (100.4F), Mild Pain (1 - 3)  ondansetron Injectable 4 milliGRAM(s) IV Push once PRN Nausea and/or Vomiting      Vital Signs Last 24 Hrs  T(C): 36.9 (11 Apr 2025 06:05), Max: 39 (10 Apr 2025 22:00)  T(F): 98.5 (11 Apr 2025 06:05), Max: 102.2 (10 Apr 2025 22:00)  HR: 70 (11 Apr 2025 06:05) (64 - 89)  BP: 108/57 (11 Apr 2025 06:05) (108/57 - 140/57)  BP(mean): --  RR: 18 (11 Apr 2025 06:05) (16 - 18)  SpO2: 100% (11 Apr 2025 06:05) (96% - 100%)    Parameters below as of 11 Apr 2025 06:05  Patient On (Oxygen Delivery Method): room air      CAPILLARY BLOOD GLUCOSE        I&O's Summary    10 Apr 2025 07:01  -  11 Apr 2025 07:00  --------------------------------------------------------  IN: 1020 mL / OUT: 750 mL / NET: 270 mL        PHYSICAL EXAM:  GENERAL: NAD, well-developed  HEAD:  Atraumatic, Normocephalic  EYES: EOMI, PERRLA, conjunctiva and sclera clear  NECK: Supple, No JVD  CHEST/LUNG: Clear to auscultation bilaterally; No wheeze  HEART: Regular rate and rhythm; No murmurs, rubs, or gallops  ABDOMEN: Soft, Nontender, Nondistended; Bowel sounds present  EXTREMITIES:  2+ Peripheral Pulses, No clubbing, cyanosis, or edema  PSYCH: AAOx3  NEUROLOGY: non-focal  SKIN: No rashes or lesions    LABS:                        12.2   3.31  )-----------( 134      ( 11 Apr 2025 05:20 )             36.0     04-11    135  |  105  |  14  ----------------------------<  99  4.1   |  20[L]  |  1.20    Ca    8.2[L]      11 Apr 2025 05:20  Phos  1.5     04-10  Mg     1.80     04-10    TPro  5.8[L]  /  Alb  3.3  /  TBili  0.9  /  DBili  x   /  AST  63[H]  /  ALT  43[H]  /  AlkPhos  58  04-11          Urinalysis Basic - ( 11 Apr 2025 05:20 )    Color: x / Appearance: x / SG: x / pH: x  Gluc: 99 mg/dL / Ketone: x  / Bili: x / Urobili: x   Blood: x / Protein: x / Nitrite: x   Leuk Esterase: x / RBC: x / WBC x   Sq Epi: x / Non Sq Epi: x / Bacteria: x        RADIOLOGY & ADDITIONAL TESTS:    Imaging Personally Reviewed:    Consultant(s) Notes Reviewed:      Care Discussed with Consultants/Other Providers:   Patient is a 74y old  Male who presents with a chief complaint of gram negative bacteremia (10 Apr 2025 17:46)      SUBJECTIVE / OVERNIGHT EVENTS: patient seen and examined by bedside, pt more alert, awake , was febrile to 102.2 last night , low grade fever this morning   dysuria resolving , hematuria resolved   MEDICATIONS  (STANDING):  aspirin  chewable 81 milliGRAM(s) Oral daily  influenza  Vaccine (HIGH DOSE) 0.5 milliLiter(s) IntraMuscular once  lactated ringers. 1000 milliLiter(s) (100 mL/Hr) IV Continuous <Continuous>  piperacillin/tazobactam IVPB.. 3.375 Gram(s) IV Intermittent every 8 hours  rosuvastatin 5 milliGRAM(s) Oral at bedtime    MEDICATIONS  (PRN):  acetaminophen     Tablet .. 650 milliGRAM(s) Oral every 6 hours PRN Temp greater or equal to 38C (100.4F), Mild Pain (1 - 3)  ondansetron Injectable 4 milliGRAM(s) IV Push once PRN Nausea and/or Vomiting      Vital Signs Last 24 Hrs  T(C): 36.9 (11 Apr 2025 06:05), Max: 39 (10 Apr 2025 22:00)  T(F): 98.5 (11 Apr 2025 06:05), Max: 102.2 (10 Apr 2025 22:00)  HR: 70 (11 Apr 2025 06:05) (64 - 89)  BP: 108/57 (11 Apr 2025 06:05) (108/57 - 140/57)  BP(mean): --  RR: 18 (11 Apr 2025 06:05) (16 - 18)  SpO2: 100% (11 Apr 2025 06:05) (96% - 100%)    Parameters below as of 11 Apr 2025 06:05  Patient On (Oxygen Delivery Method): room air      CAPILLARY BLOOD GLUCOSE        I&O's Summary    10 Apr 2025 07:01  -  11 Apr 2025 07:00  --------------------------------------------------------  IN: 1020 mL / OUT: 750 mL / NET: 270 mL        PHYSICAL EXAM:  GENERAL: NAD, well-developed  HEAD:  Atraumatic, Normocephalic  EYES: EOMI, PERRLA, conjunctiva and sclera clear  NECK: Supple, No JVD  CHEST/LUNG: Clear to auscultation bilaterally; No wheeze  HEART: Regular rate and rhythm; No murmurs, rubs, or gallops  ABDOMEN: Soft, Nontender, Nondistended; Bowel sounds present, no CVA tenderness   EXTREMITIES:  2+ Peripheral Pulses, No clubbing, cyanosis, or edema  PSYCH: AAOx3  NEUROLOGY: non-focal  SKIN: No rashes or lesions    LABS:                        12.2   3.31  )-----------( 134      ( 11 Apr 2025 05:20 )             36.0     04-11    135  |  105  |  14  ----------------------------<  99  4.1   |  20[L]  |  1.20    Ca    8.2[L]      11 Apr 2025 05:20  Phos  1.5     04-10  Mg     1.80     04-10    TPro  5.8[L]  /  Alb  3.3  /  TBili  0.9  /  DBili  x   /  AST  63[H]  /  ALT  43[H]  /  AlkPhos  58  04-11          Urinalysis Basic - ( 11 Apr 2025 05:20 )    Color: x / Appearance: x / SG: x / pH: x  Gluc: 99 mg/dL / Ketone: x  / Bili: x / Urobili: x   Blood: x / Protein: x / Nitrite: x   Leuk Esterase: x / RBC: x / WBC x   Sq Epi: x / Non Sq Epi: x / Bacteria: x        RADIOLOGY & ADDITIONAL TESTS:    Imaging Personally Reviewed:    Consultant(s) Notes Reviewed:  ID     Care Discussed with Consultants/Other Providers:

## 2025-04-11 NOTE — PROGRESS NOTE ADULT - PROBLEM SELECTOR PLAN 3
Cr elevated to 1.59 on admission; Feb 2025 outpatient labwork with Cr 1.15 (PCP at [facility], records on pt phone). S/p 2L NS in ED  - C/w mIVF - LR @ 100cc/hr  - Monitor UOP  - cr trending down, Trend SCr  - avoid nephrotoxics

## 2025-04-11 NOTE — PROGRESS NOTE ADULT - PROBLEM SELECTOR PLAN 1
Presenting as callback after [facility] blood cultures showed gram-negative bacteremia iso recent transrectal prostate biopsy (4/7/25, Dr. Tung Gresham), on Bactrim/Cefdinir pre- and post-procedure. ED visit to [facility] 4/8 for nausea/vomiting/chills- at [facility] CT scan of abdomen/pelvis read as no acute findings, no abscess/hematoma, mild stranding compatible with recent biopsy. Endorsing hematuria, mild dysuria following biopsy; suspect  source given history. On admission Tmax 102.8, WBC wnl; s/p Zosyn x1 in ED  pt admitted with sepsis and bacteremia   - on  Zosyn 3.375g q12 (dose adjusted for RHIANNON),  Blood cx with E coli , ID rec to change abx to Ertapenem and can be discharged on PO Cipro BID to complete 14 days course if CT scan negative for abscess   - F/u repeat blood cultures- NGTD   - UA w/ mod LE/blood, f/u urine culture also with no growth  - Urology following, appreciate recs  - Tylenol prn for fever  -ID eval requested for  abx management , recs appreciated

## 2025-04-11 NOTE — PROGRESS NOTE ADULT - PROBLEM SELECTOR PLAN 2
Reporting onset of hematuria since transrectal biopsy, likely iso procedure. H/H 13/39.4 on admission, hemodynamically stable  now resolved   - Monitor urine   - Trend CBC  - On home aspirin (unclear indication), no other blood thinners - will c/w ASA for now

## 2025-04-11 NOTE — PROGRESS NOTE ADULT - PROBLEM SELECTOR PLAN 7
DVT ppx - hold chemical ppx iso hematuria, OOB   Diet - DASH, no lactose/dairy, no eggs, no meat  Dispo - pending clinical improvement   plan of care d/w pt and wife at bedside

## 2025-04-11 NOTE — PROGRESS NOTE ADULT - SUBJECTIVE AND OBJECTIVE BOX
CC: F/U for Bacteremia    Saw/spoke to patient. Unchanged.    Allergies  Valium (Other)  lactose (Unknown)    ANTIMICROBIALS:  piperacillin/tazobactam IVPB.. 3.375 every 8 hours    PE:    Vital Signs Last 24 Hrs  T(C): 37.2 (11 Apr 2025 10:00), Max: 39 (10 Apr 2025 22:00)  T(F): 99 (11 Apr 2025 10:00), Max: 102.2 (10 Apr 2025 22:00)  HR: 72 (11 Apr 2025 10:00) (67 - 89)  BP: 118/63 (11 Apr 2025 10:00) (108/57 - 140/57)  RR: 17 (11 Apr 2025 10:00) (16 - 18)  SpO2: 97% (11 Apr 2025 10:00) (96% - 100%)    Gen: AOx3, NAD, non-toxic  CV: Nontachycardic  Resp: Breathing comfortably, RA  Abd: Soft, nontender  IV/Skin: No thrombophlebitis    LABS:                        12.2   3.31  )-----------( 134      ( 11 Apr 2025 05:20 )             36.0     04-11    135  |  105  |  14  ----------------------------<  99  4.1   |  20[L]  |  1.20    Ca    8.2[L]      11 Apr 2025 05:20  Phos  1.5     04-10  Mg     1.80     04-10    TPro  5.8[L]  /  Alb  3.3  /  TBili  0.9  /  DBili  x   /  AST  63[H]  /  ALT  43[H]  /  AlkPhos  58  04-11    Urinalysis Basic - ( 11 Apr 2025 05:20 )    Color: x / Appearance: x / SG: x / pH: x  Gluc: 99 mg/dL / Ketone: x  / Bili: x / Urobili: x   Blood: x / Protein: x / Nitrite: x   Leuk Esterase: x / RBC: x / WBC x   Sq Epi: x / Non Sq Epi: x / Bacteria: x    MICROBIOLOGY:    Clean Catch  04-09-25   <10,000 CFU/mL Normal Urogenital Mary  --  --    Blood Blood-Peripheral  04-09-25   No growth at 24 hours  --  --    Blood Blood-Peripheral  04-09-25 No growth at 24 hours  --  --    RADIOLOGY:    4/9 XR    FINDINGS:  The heart size is normal.  The lungs are clear.  There is no pneumothorax or pleural effusion.    IMPRESSION:  Clear lungs.

## 2025-04-12 ENCOUNTER — TRANSCRIPTION ENCOUNTER (OUTPATIENT)
Age: 75
End: 2025-04-12

## 2025-04-12 LAB
ADD ON TEST-SPECIMEN IN LAB: SIGNIFICANT CHANGE UP
ALBUMIN SERPL ELPH-MCNC: 3.3 G/DL — SIGNIFICANT CHANGE UP (ref 3.3–5)
ALP SERPL-CCNC: 68 U/L — SIGNIFICANT CHANGE UP (ref 40–120)
ALT FLD-CCNC: 63 U/L — HIGH (ref 4–41)
ANION GAP SERPL CALC-SCNC: 12 MMOL/L — SIGNIFICANT CHANGE UP (ref 7–14)
AST SERPL-CCNC: 80 U/L — HIGH (ref 4–40)
BASOPHILS # BLD AUTO: 0.05 K/UL — SIGNIFICANT CHANGE UP (ref 0–0.2)
BASOPHILS NFR BLD AUTO: 1.4 % — SIGNIFICANT CHANGE UP (ref 0–2)
BILIRUB DIRECT SERPL-MCNC: <0.2 MG/DL — SIGNIFICANT CHANGE UP (ref 0–0.3)
BILIRUB INDIRECT FLD-MCNC: >0.4 MG/DL — SIGNIFICANT CHANGE UP (ref 0–1)
BILIRUB SERPL-MCNC: 0.6 MG/DL — SIGNIFICANT CHANGE UP (ref 0.2–1.2)
BUN SERPL-MCNC: 12 MG/DL — SIGNIFICANT CHANGE UP (ref 7–23)
CALCIUM SERPL-MCNC: 8.7 MG/DL — SIGNIFICANT CHANGE UP (ref 8.4–10.5)
CHLORIDE SERPL-SCNC: 104 MMOL/L — SIGNIFICANT CHANGE UP (ref 98–107)
CO2 SERPL-SCNC: 21 MMOL/L — LOW (ref 22–31)
CREAT SERPL-MCNC: 1.11 MG/DL — SIGNIFICANT CHANGE UP (ref 0.5–1.3)
EGFR: 70 ML/MIN/1.73M2 — SIGNIFICANT CHANGE UP
EGFR: 70 ML/MIN/1.73M2 — SIGNIFICANT CHANGE UP
EOSINOPHIL # BLD AUTO: 0.03 K/UL — SIGNIFICANT CHANGE UP (ref 0–0.5)
EOSINOPHIL NFR BLD AUTO: 0.8 % — SIGNIFICANT CHANGE UP (ref 0–6)
GLUCOSE SERPL-MCNC: 84 MG/DL — SIGNIFICANT CHANGE UP (ref 70–99)
HCT VFR BLD CALC: 40.2 % — SIGNIFICANT CHANGE UP (ref 39–50)
HGB BLD-MCNC: 13.4 G/DL — SIGNIFICANT CHANGE UP (ref 13–17)
IANC: 2.17 K/UL — SIGNIFICANT CHANGE UP (ref 1.8–7.4)
IMM GRANULOCYTES NFR BLD AUTO: 0.3 % — SIGNIFICANT CHANGE UP (ref 0–0.9)
LYMPHOCYTES # BLD AUTO: 0.95 K/UL — LOW (ref 1–3.3)
LYMPHOCYTES # BLD AUTO: 25.7 % — SIGNIFICANT CHANGE UP (ref 13–44)
MAGNESIUM SERPL-MCNC: 2.1 MG/DL — SIGNIFICANT CHANGE UP (ref 1.6–2.6)
MCHC RBC-ENTMCNC: 29.1 PG — SIGNIFICANT CHANGE UP (ref 27–34)
MCHC RBC-ENTMCNC: 33.3 G/DL — SIGNIFICANT CHANGE UP (ref 32–36)
MCV RBC AUTO: 87.4 FL — SIGNIFICANT CHANGE UP (ref 80–100)
MONOCYTES # BLD AUTO: 0.48 K/UL — SIGNIFICANT CHANGE UP (ref 0–0.9)
MONOCYTES NFR BLD AUTO: 13 % — SIGNIFICANT CHANGE UP (ref 2–14)
NEUTROPHILS # BLD AUTO: 2.17 K/UL — SIGNIFICANT CHANGE UP (ref 1.8–7.4)
NEUTROPHILS NFR BLD AUTO: 58.8 % — SIGNIFICANT CHANGE UP (ref 43–77)
NRBC # BLD AUTO: 0 K/UL — SIGNIFICANT CHANGE UP (ref 0–0)
NRBC # FLD: 0 K/UL — SIGNIFICANT CHANGE UP (ref 0–0)
NRBC BLD AUTO-RTO: 0 /100 WBCS — SIGNIFICANT CHANGE UP (ref 0–0)
PHOSPHATE SERPL-MCNC: 3.4 MG/DL — SIGNIFICANT CHANGE UP (ref 2.5–4.5)
PLATELET # BLD AUTO: 147 K/UL — LOW (ref 150–400)
POTASSIUM SERPL-MCNC: 4.5 MMOL/L — SIGNIFICANT CHANGE UP (ref 3.5–5.3)
POTASSIUM SERPL-SCNC: 4.5 MMOL/L — SIGNIFICANT CHANGE UP (ref 3.5–5.3)
PROT SERPL-MCNC: 6 G/DL — SIGNIFICANT CHANGE UP (ref 6–8.3)
RBC # BLD: 4.6 M/UL — SIGNIFICANT CHANGE UP (ref 4.2–5.8)
RBC # FLD: 12.6 % — SIGNIFICANT CHANGE UP (ref 10.3–14.5)
SODIUM SERPL-SCNC: 137 MMOL/L — SIGNIFICANT CHANGE UP (ref 135–145)
WBC # BLD: 3.69 K/UL — LOW (ref 3.8–10.5)
WBC # FLD AUTO: 3.69 K/UL — LOW (ref 3.8–10.5)

## 2025-04-12 PROCEDURE — 99232 SBSQ HOSP IP/OBS MODERATE 35: CPT

## 2025-04-12 RX ORDER — ACETAMINOPHEN 500 MG/5ML
650 LIQUID (ML) ORAL ONCE
Refills: 0 | Status: COMPLETED | OUTPATIENT
Start: 2025-04-12 | End: 2025-04-12

## 2025-04-12 RX ADMIN — Medication 650 MILLIGRAM(S): at 02:47

## 2025-04-12 RX ADMIN — ROSUVASTATIN CALCIUM 5 MILLIGRAM(S): 5 TABLET, FILM COATED ORAL at 22:26

## 2025-04-12 RX ADMIN — Medication 650 MILLIGRAM(S): at 03:41

## 2025-04-12 RX ADMIN — Medication 650 MILLIGRAM(S): at 18:20

## 2025-04-12 RX ADMIN — ERTAPENEM SODIUM 100 MILLIGRAM(S): 1 INJECTION, POWDER, LYOPHILIZED, FOR SOLUTION INTRAMUSCULAR; INTRAVENOUS at 13:11

## 2025-04-12 RX ADMIN — Medication 650 MILLIGRAM(S): at 17:20

## 2025-04-12 RX ADMIN — Medication 81 MILLIGRAM(S): at 13:11

## 2025-04-12 NOTE — DISCHARGE NOTE PROVIDER - NSDCADMDATE_GEN_ALL_CORE_FT
09-Apr-2025 17:38 Cantharidin Counseling: Calcipotriene Counseling:  I discussed with the patient the risks of calcipotriene including but not limited to erythema, scaling, itching, and irritation. Erythromycin Counseling:  I discussed with the patient the risks of erythromycin including but not limited to GI upset, allergic reaction, drug rash, diarrhea, increase in liver enzymes, and yeast infections. Glycopyrrolate Counseling:  I discussed with the patient the risks of glycopyrrolate including but not limited to skin rash, drowsiness, dry mouth, difficulty urinating, and blurred vision. Olumiant Pregnancy And Lactation Text: Based on animal studies, Olumiant may cause embryo-fetal harm when administered to pregnant women.  The medication should not be used in pregnancy.  Breastfeeding is not recommended during treatment. Clofazimine Pregnancy And Lactation Text: This medication is Pregnancy Category C and isn't considered safe during pregnancy. It is excreted in breast milk. Bexarotene Counseling:  I discussed with the patient the risks of bexarotene including but not limited to hair loss, dry lips/skin/eyes, liver abnormalities, hyperlipidemia, pancreatitis, depression/suicidal ideation, photosensitivity, drug rash/allergic reactions, hypothyroidism, anemia, leukopenia, infection, cataracts, and teratogenicity.  Patient understands that they will need regular blood tests to check lipid profile, liver function tests, white blood cell count, thyroid function tests and pregnancy test if applicable. Clofazimine Counseling:  I discussed with the patient the risks of clofazimine including but not limited to skin and eye pigmentation, liver damage, nausea/vomiting, gastrointestinal bleeding and allergy. Acitretin Pregnancy And Lactation Text: This medication is Pregnancy Category X and should not be given to women who are pregnant or may become pregnant in the future. This medication is excreted in breast milk. Propranolol Counseling:  I discussed with the patient the risks of propranolol including but not limited to low heart rate, low blood pressure, low blood sugar, restlessness and increased cold sensitivity. They should call the office if they experience any of these side effects. Ivermectin Pregnancy And Lactation Text: This medication is Pregnancy Category C and it isn't known if it is safe during pregnancy. It is also excreted in breast milk. Griseofulvin Pregnancy And Lactation Text: This medication is Pregnancy Category X and is known to cause serious birth defects. It is unknown if this medication is excreted in breast milk but breast feeding should be avoided. Mirvaso Counseling: Mirvaso is a topical medication which can decrease superficial blood flow where applied. Side effects are uncommon and include stinging, redness and allergic reactions. Topical Clindamycin Counseling: Patient counseled that this medication may cause skin irritation or allergic reactions.  In the event of skin irritation, the patient was advised to reduce the amount of the drug applied or use it less frequently.   The patient verbalized understanding of the proper use and possible adverse effects of clindamycin.  All of the patient's questions and concerns were addressed. Colchicine Counseling:  Patient counseled regarding adverse effects including but not limited to stomach upset (nausea, vomiting, stomach pain, or diarrhea).  Patient instructed to limit alcohol consumption while taking this medication.  Colchicine may reduce blood counts especially with prolonged use.  The patient understands that monitoring of kidney function and blood counts may be required, especially at baseline. The patient verbalized understanding of the proper use and possible adverse effects of colchicine.  All of the patient's questions and concerns were addressed. Erythromycin Pregnancy And Lactation Text: This medication is Pregnancy Category B and is considered safe during pregnancy. It is also excreted in breast milk. Bexarotene Pregnancy And Lactation Text: This medication is Pregnancy Category X and should not be given to women who are pregnant or may become pregnant. This medication should not be used if you are breast feeding. Xolair Counseling:  Patient informed of potential adverse effects including but not limited to fever, muscle aches, rash and allergic reactions.  The patient verbalized understanding of the proper use and possible adverse effects of Xolair.  All of the patient's questions and concerns were addressed. Rinvoq Counseling: I discussed with the patient the risks of Rinvoq therapy including but not limited to upper respiratory tract infections, shingles, cold sores, bronchitis, nausea, cough, fever, acne, and headache. Live vaccines should be avoided.  This medication has been linked to serious infections; higher rate of mortality; malignancy and lymphoproliferative disorders; major adverse cardiovascular events; thrombosis; thrombocytopenia, anemia, and neutropenia; lipid elevations; liver enzyme elevations; and gastrointestinal perforations. Birth Control Pills Counseling: Birth Control Pill Counseling: I discussed with the patient the potential side effects of OCPs including but not limited to increased risk of stroke, heart attack, thrombophlebitis, deep venous thrombosis, hepatic adenomas, breast changes, GI upset, headaches, and depression.  The patient verbalized understanding of the proper use and possible adverse effects of OCPs. All of the patient's questions and concerns were addressed. Propranolol Pregnancy And Lactation Text: This medication is Pregnancy Category C and it isn't known if it is safe during pregnancy. It is excreted in breast milk. Mirvaso Pregnancy And Lactation Text: This medication has not been assigned a Pregnancy Risk Category. It is unknown if the medication is excreted in breast milk. Itraconazole Counseling:  I discussed with the patient the risks of itraconazole including but not limited to liver damage, nausea/vomiting, neuropathy, and severe allergy.  The patient understands that this medication is best absorbed when taken with acidic beverages such as non-diet cola or ginger ale.  The patient understands that monitoring is required including baseline LFTs and repeat LFTs at intervals.  The patient understands that they are to contact us or the primary physician if concerning signs are noted. Topical Clindamycin Pregnancy And Lactation Text: This medication is Pregnancy Category B and is considered safe during pregnancy. It is unknown if it is excreted in breast milk. Cantharidin Pregnancy And Lactation Text: The use of this medication during pregnancy or lactation is not recommended as there is insufficient data. Glycopyrrolate Pregnancy And Lactation Text: This medication is Pregnancy Category B and is considered safe during pregnancy. It is unknown if it is excreted breast milk. Isotretinoin Counseling: Patient should get monthly blood tests, not donate blood, not drive at night if vision affected, not share medication, and not undergo elective surgery for 6 months after tx completed. Side effects reviewed, pt to contact office should one occur. Rinvoq Pregnancy And Lactation Text: Based on animal studies, Rinvoq may cause embryo-fetal harm when administered to pregnant women.  The medication should not be used in pregnancy.  Breastfeeding is not recommended during treatment and for 6 days after the last dose. Birth Control Pills Pregnancy And Lactation Text: This medication should be avoided if pregnant and for the first 30 days post-partum. Detail Level: Simple Xolair Pregnancy And Lactation Text: This medication is Pregnancy Category B and is considered safe during pregnancy. This medication is excreted in breast milk. Topical Ketoconazole Counseling: Patient counseled that this medication may cause skin irritation or allergic reactions.  In the event of skin irritation, the patient was advised to reduce the amount of the drug applied or use it less frequently.   The patient verbalized understanding of the proper use and possible adverse effects of ketoconazole.  All of the patient's questions and concerns were addressed. Hydroxychloroquine Counseling:  I discussed with the patient that a baseline ophthalmologic exam is needed at the start of therapy and every year thereafter while on therapy. A CBC may also be warranted for monitoring.  The side effects of this medication were discussed with the patient, including but not limited to agranulocytosis, aplastic anemia, seizures, rashes, retinopathy, and liver toxicity. Patient instructed to call the office should any adverse effect occur.  The patient verbalized understanding of the proper use and possible adverse effects of Plaquenil.  All the patient's questions and concerns were addressed. Itraconazole Pregnancy And Lactation Text: This medication is Pregnancy Category C and it isn't know if it is safe during pregnancy. It is also excreted in breast milk. 5-Fu Counseling: 5-Fluorouracil Counseling:  I discussed with the patient the risks of 5-fluorouracil including but not limited to erythema, scaling, itching, weeping, crusting, and pain. Opzelura Counseling:  I discussed with the patient the risks of Opzelura including but not limited to nasopharngitis, bronchitis, ear infection, eosinophila, hives, diarrhea, folliculitis, tonsillitis, and rhinorrhea.  Taken orally, this medication has been linked to serious infections; higher rate of mortality; malignancy and lymphoproliferative disorders; major adverse cardiovascular events; thrombosis; thrombocytopenia, anemia, and neutropenia; and lipid elevations. Metronidazole Counseling:  I discussed with the patient the risks of metronidazole including but not limited to seizures, nausea/vomiting, a metallic taste in the mouth, nausea/vomiting and severe allergy. Spironolactone Counseling: Patient advised regarding risks of diarrhea, abdominal pain, hyperkalemia, birth defects (for female patients), liver toxicity and renal toxicity. The patient may need blood work to monitor liver and kidney function and potassium levels while on therapy. The patient verbalized understanding of the proper use and possible adverse effects of spironolactone.  All of the patient's questions and concerns were addressed. Topical Sulfur Applications Counseling: Topical Sulfur Counseling: Patient counseled that this medication may cause skin irritation or allergic reactions.  In the event of skin irritation, the patient was advised to reduce the amount of the drug applied or use it less frequently.   The patient verbalized understanding of the proper use and possible adverse effects of topical sulfur application.  All of the patient's questions and concerns were addressed. Dapsone Counseling: I discussed with the patient the risks of dapsone including but not limited to hemolytic anemia, agranulocytosis, rashes, methemoglobinemia, kidney failure, peripheral neuropathy, headaches, GI upset, and liver toxicity.  Patients who start dapsone require monitoring including baseline LFTs and weekly CBCs for the first month, then every month thereafter.  The patient verbalized understanding of the proper use and possible adverse effects of dapsone.  All of the patient's questions and concerns were addressed. Libtayo Counseling- I discussed with the patient the risks of Libtayo including but not limited to nausea, vomiting, diarrhea, and bone or muscle pain.  The patient verbalized understanding of the proper use and possible adverse effects of Libtayo.  All of the patient's questions and concerns were addressed. Rituxan Counseling:  I discussed with the patient the risks of Rituxan infusions. Side effects can include infusion reactions, severe drug rashes including mucocutaneous reactions, reactivation of latent hepatitis and other infections and rarely progressive multifocal leukoencephalopathy.  All of the patient's questions and concerns were addressed. Adbry Counseling: I discussed with the patient the risks of tralokinumab including but not limited to eye infection and irritation, cold sores, injection site reactions, worsening of asthma, allergic reactions and increased risk of parasitic infection.  Live vaccines should be avoided while taking tralokinumab. The patient understands that monitoring is required and they must alert us or the primary physician if symptoms of infection or other concerning signs are noted. Drysol Counseling:  I discussed with the patient the risks of drysol/aluminum chloride including but not limited to skin rash, itching, irritation, burning. Picato Counseling:  I discussed with the patient the risks of Picato including but not limited to erythema, scaling, itching, weeping, crusting, and pain. Metronidazole Pregnancy And Lactation Text: This medication is Pregnancy Category B and considered safe during pregnancy.  It is also excreted in breast milk. 5-Fu Pregnancy And Lactation Text: This medication is Pregnancy Category X and contraindicated in pregnancy and in women who may become pregnant. It is unknown if this medication is excreted in breast milk. Hydroxychloroquine Pregnancy And Lactation Text: This medication has been shown to cause fetal harm but it isn't assigned a Pregnancy Risk Category. There are small amounts excreted in breast milk. Opzelura Pregnancy And Lactation Text: There is insufficient data to evaluate drug-associated risk for major birth defects, miscarriage, or other adverse maternal or fetal outcomes.  There is a pregnancy registry that monitors pregnancy outcomes in pregnant persons exposed to the medication during pregnancy.  It is unknown if this medication is excreted in breast milk.  Do not breastfeed during treatment and for about 4 weeks after the last dose. Azathioprine Counseling:  I discussed with the patient the risks of azathioprine including but not limited to myelosuppression, immunosuppression, hepatotoxicity, lymphoma, and infections.  The patient understands that monitoring is required including baseline LFTs, Creatinine, possible TPMP genotyping and weekly CBCs for the first month and then every 2 weeks thereafter.  The patient verbalized understanding of the proper use and possible adverse effects of azathioprine.  All of the patient's questions and concerns were addressed. Ketoconazole Counseling:   Patient counseled regarding improving absorption with orange juice.  Adverse effects include but are not limited to breast enlargement, headache, diarrhea, nausea, upset stomach, liver function test abnormalities, taste disturbance, and stomach pain.  There is a rare possibility of liver failure that can occur when taking ketoconazole. The patient understands that monitoring of LFTs may be required, especially at baseline. The patient verbalized understanding of the proper use and possible adverse effects of ketoconazole.  All of the patient's questions and concerns were addressed. Isotretinoin Pregnancy And Lactation Text: This medication is Pregnancy Category X and is considered extremely dangerous during pregnancy. It is unknown if it is excreted in breast milk. Dapsone Pregnancy And Lactation Text: This medication is Pregnancy Category C and is not considered safe during pregnancy or breast feeding. Libtayo Pregnancy And Lactation Text: This medication is contraindicated in pregnancy and when breast feeding. Adbry Pregnancy And Lactation Text: It is unknown if this medication will adversely affect pregnancy or breast feeding. Picato Pregnancy And Lactation Text: This medication is Pregnancy Category C. It is unknown if this medication is excreted in breast milk. Terbinafine Counseling: Patient counseling regarding adverse effects of terbinafine including but not limited to headache, diarrhea, rash, upset stomach, liver function test abnormalities, itching, taste/smell disturbance, nausea, abdominal pain, and flatulence.  There is a rare possibility of liver failure that can occur when taking terbinafine.  The patient understands that a baseline LFT and kidney function test may be required. The patient verbalized understanding of the proper use and possible adverse effects of terbinafine.  All of the patient's questions and concerns were addressed. Topical Sulfur Applications Pregnancy And Lactation Text: This medication is Pregnancy Category C and has an unknown safety profile during pregnancy. It is unknown if this topical medication is excreted in breast milk. Rituxan Pregnancy And Lactation Text: This medication is Pregnancy Category C and it isn't know if it is safe during pregnancy. It is unknown if this medication is excreted in breast milk but similar antibodies are known to be excreted. Siliq Counseling:  I discussed with the patient the risks of Siliq including but not limited to new or worsening depression, suicidal thoughts and behavior, immunosuppression, malignancy, posterior leukoencephalopathy syndrome, and serious infections.  The patient understands that monitoring is required including a PPD at baseline and must alert us or the primary physician if symptoms of infection or other concerning signs are noted. There is also a special program designed to monitor depression which is required with Siliq. High Dose Vitamin A Counseling: Side effects reviewed, pt to contact office should one occur. Ketoconazole Pregnancy And Lactation Text: This medication is Pregnancy Category C and it isn't know if it is safe during pregnancy. It is also excreted in breast milk and breast feeding isn't recommended. Include Pregnancy/Lactation Warning?: No Minocycline Counseling: Patient advised regarding possible photosensitivity and discoloration of the teeth, skin, lips, tongue and gums.  Patient instructed to avoid sunlight, if possible.  When exposed to sunlight, patients should wear protective clothing, sunglasses, and sunscreen.  The patient was instructed to call the office immediately if the following severe adverse effects occur:  hearing changes, easy bruising/bleeding, severe headache, or vision changes.  The patient verbalized understanding of the proper use and possible adverse effects of minocycline.  All of the patient's questions and concerns were addressed. Spironolactone Pregnancy And Lactation Text: This medication can cause feminization of the male fetus and should be avoided during pregnancy. The active metabolite is also found in breast milk. Azathioprine Pregnancy And Lactation Text: This medication is Pregnancy Category D and isn't considered safe during pregnancy. It is unknown if this medication is excreted in breast milk. SSKI Counseling:  I discussed with the patient the risks of SSKI including but not limited to thyroid abnormalities, metallic taste, GI upset, fever, headache, acne, arthralgias, paraesthesias, lymphadenopathy, easy bleeding, arrhythmias, and allergic reaction. Niacinamide Counseling: I recommended taking niacin or niacinamide, also know as vitamin B3, twice daily. Recent evidence suggests that taking vitamin B3 (500 mg twice daily) can reduce the risk of actinic keratoses and non-melanoma skin cancers. Side effects of vitamin B3 include flushing and headache. Cellcept Counseling:  I discussed with the patient the risks of mycophenolate mofetil including but not limited to infection/immunosuppression, GI upset, hypokalemia, hypercholesterolemia, bone marrow suppression, lymphoproliferative disorders, malignancy, GI ulceration/bleed/perforation, colitis, interstitial lung disease, kidney failure, progressive multifocal leukoencephalopathy, and birth defects.  The patient understands that monitoring is required including a baseline creatinine and regular CBC testing. In addition, patient must alert us immediately if symptoms of infection or other concerning signs are noted. Protopic Counseling: Patient may experience a mild burning sensation during topical application. Protopic is not approved in children less than 2 years of age. There have been case reports of hematologic and skin malignancies in patients using topical calcineurin inhibitors although causality is questionable. Wartpeel Counseling:  I discussed with the patient the risks of Wartpeel including but not limited to erythema, scaling, itching, weeping, crusting, and pain. Terbinafine Pregnancy And Lactation Text: This medication is Pregnancy Category B and is considered safe during pregnancy. It is also excreted in breast milk and breast feeding isn't recommended. Elidel Counseling: Patient may experience a mild burning sensation during topical application. Elidel is not approved in children less than 2 years of age. There have been case reports of hematologic and skin malignancies in patients using topical calcineurin inhibitors although causality is questionable. High Dose Vitamin A Pregnancy And Lactation Text: High dose vitamin A therapy is contraindicated during pregnancy and breast feeding. Drysol Pregnancy And Lactation Text: This medication is considered safe during pregnancy and breast feeding. Minocycline Pregnancy And Lactation Text: This medication is Pregnancy Category D and not consider safe during pregnancy. It is also excreted in breast milk. Siliq Pregnancy And Lactation Text: The risk during pregnancy and breastfeeding is uncertain with this medication. Cibinqo Counseling: I discussed with the patient the risks of Cibinqo therapy including but not limited to common cold, nausea, headache, cold sores, increased blood CPK levels, dizziness, UTIs, fatigue, acne, and vomitting. Live vaccines should be avoided.  This medication has been linked to serious infections; higher rate of mortality; malignancy and lymphoproliferative disorders; major adverse cardiovascular events; thrombosis; thrombocytopenia and lymphopenia; lipid elevations; and retinal detachment. Dutasteride Counseling: Dustasteride Counseling:  I discussed with the patient the risks of use of dutasteride including but not limited to decreased libido, decreased ejaculate volume, and gynecomastia. Women who can become pregnant should not handle medication.  All of the patient's questions and concerns were addressed. Simponi Counseling:  I discussed with the patient the risks of golimumab including but not limited to myelosuppression, immunosuppression, autoimmune hepatitis, demyelinating diseases, lymphoma, and serious infections.  The patient understands that monitoring is required including a PPD at baseline and must alert us or the primary physician if symptoms of infection or other concerning signs are noted. Niacinamide Pregnancy And Lactation Text: These medications are considered safe during pregnancy. Dutasteride Pregnancy And Lactation Text: This medication is absolutely contraindicated in women, especially during pregnancy and breast feeding. Feminization of male fetuses is possible if taking while pregnant. Quinolones Counseling:  I discussed with the patient the risks of fluoroquinolones including but not limited to GI upset, allergic reaction, drug rash, diarrhea, dizziness, photosensitivity, yeast infections, liver function test abnormalities, tendonitis/tendon rupture. Dupixent Counseling: I discussed with the patient the risks of dupilumab including but not limited to eye infection and irritation, cold sores, injection site reactions, worsening of asthma, allergic reactions and increased risk of parasitic infection.  Live vaccines should be avoided while taking dupilumab. Dupilumab will also interact with certain medications such as warfarin and cyclosporine. The patient understands that monitoring is required and they must alert us or the primary physician if symptoms of infection or other concerning signs are noted. Cibinqo Pregnancy And Lactation Text: It is unknown if this medication will adversely affect pregnancy or breast feeding.  You should not take this medication if you are currently pregnant or planning a pregnancy or while breastfeeding. Sski Pregnancy And Lactation Text: This medication is Pregnancy Category D and isn't considered safe during pregnancy. It is excreted in breast milk. Protopic Pregnancy And Lactation Text: This medication is Pregnancy Category C. It is unknown if this medication is excreted in breast milk when applied topically. Nsaids Counseling: NSAID Counseling: I discussed with the patient that NSAIDs should be taken with food. Prolonged use of NSAIDs can result in the development of stomach ulcers.  Patient advised to stop taking NSAIDs if abdominal pain occurs.  The patient verbalized understanding of the proper use and possible adverse effects of NSAIDs.  All of the patient's questions and concerns were addressed. Rifampin Counseling: I discussed with the patient the risks of rifampin including but not limited to liver damage, kidney damage, red-orange body fluids, nausea/vomiting and severe allergy. Eucrisa Counseling: Patient may experience a mild burning sensation during topical application. Eucrisa is not approved in children less than 2 years of age. Cyclophosphamide Pregnancy And Lactation Text: This medication is Pregnancy Category D and it isn't considered safe during pregnancy. This medication is excreted in breast milk. Aklief counseling:  Patient advised to apply a pea-sized amount only at bedtime and wait 30 minutes after washing their face before applying.  If too drying, patient may add a non-comedogenic moisturizer.  The most commonly reported side effects including irritation, redness, scaling, dryness, stinging, burning, itching, and increased risk of sunburn.  The patient verbalized understanding of the proper use and possible adverse effects of retinoids.  All of the patient's questions and concerns were addressed. Cyclophosphamide Counseling:  I discussed with the patient the risks of cyclophosphamide including but not limited to hair loss, hormonal abnormalities, decreased fertility, abdominal pain, diarrhea, nausea and vomiting, bone marrow suppression and infection. The patient understands that monitoring is required while taking this medication. Cimzia Counseling:  I discussed with the patient the risks of Cimzia including but not limited to immunosuppression, allergic reactions and infections.  The patient understands that monitoring is required including a PPD at baseline and must alert us or the primary physician if symptoms of infection or other concerning signs are noted. Qbrexza Counseling:  I discussed with the patient the risks of Qbrexza including but not limited to headache, mydriasis, blurred vision, dry eyes, nasal dryness, dry mouth, dry throat, dry skin, urinary hesitation, and constipation.  Local skin reactions including erythema, burning, stinging, and itching can also occur. Cimetidine Counseling:  I discussed with the patient the risks of Cimetidine including but not limited to gynecomastia, headache, diarrhea, nausea, drowsiness, arrhythmias, pancreatitis, skin rashes, psychosis, bone marrow suppression and kidney toxicity. Winlevi Counseling:  I discussed with the patient the risks of topical clascoterone including but not limited to erythema, scaling, itching, and stinging. Patient voiced their understanding. Dupixent Pregnancy And Lactation Text: This medication likely crosses the placenta but the risk for the fetus is uncertain. This medication is excreted in breast milk. Thalidomide Counseling: I discussed with the patient the risks of thalidomide including but not limited to birth defects, anxiety, weakness, chest pain, dizziness, cough and severe allergy. Enbrel Counseling:  I discussed with the patient the risks of etanercept including but not limited to myelosuppression, immunosuppression, autoimmune hepatitis, demyelinating diseases, lymphoma, and infections.  The patient understands that monitoring is required including a PPD at baseline and must alert us or the primary physician if symptoms of infection or other concerning signs are noted. Rifampin Pregnancy And Lactation Text: This medication is Pregnancy Category C and it isn't know if it is safe during pregnancy. It is also excreted in breast milk and should not be used if you are breast feeding. Aklief Pregnancy And Lactation Text: It is unknown if this medication is safe to use during pregnancy.  It is unknown if this medication is excreted in breast milk.  Breastfeeding women should use the topical cream on the smallest area of the skin for the shortest time needed while breastfeeding.  Do not apply to nipple and areola. Azithromycin Pregnancy And Lactation Text: This medication is considered safe during pregnancy and is also secreted in breast milk. Skyrizi Counseling: I discussed with the patient the risks of risankizumab-rzaa including but not limited to immunosuppression, and serious infections.  The patient understands that monitoring is required including a PPD at baseline and must alert us or the primary physician if symptoms of infection or other concerning signs are noted. Tranexamic Acid Counseling:  Patient advised of the small risk of bleeding problems with tranexamic acid. They were also instructed to call if they developed any nausea, vomiting or diarrhea. All of the patient's questions and concerns were addressed. Thalidomide Pregnancy And Lactation Text: This medication is Pregnancy Category X and is absolutely contraindicated during pregnancy. It is unknown if it is excreted in breast milk. Azithromycin Counseling:  I discussed with the patient the risks of azithromycin including but not limited to GI upset, allergic reaction, drug rash, diarrhea, and yeast infections. Qbrexza Pregnancy And Lactation Text: There is no available data on Qbrexza use in pregnant women.  There is no available data on Qbrexza use in lactation. Winlevi Pregnancy And Lactation Text: This medication is considered safe during pregnancy and breastfeeding. Eucrisa Pregnancy And Lactation Text: This medication has not been assigned a Pregnancy Risk Category but animal studies failed to show danger with the topical medication. It is unknown if the medication is excreted in breast milk. Nsaids Pregnancy And Lactation Text: These medications are considered safe up to 30 weeks gestation. It is excreted in breast milk. Cimzia Pregnancy And Lactation Text: This medication crosses the placenta but can be considered safe in certain situations. Cimzia may be excreted in breast milk. Azelaic Acid Counseling: Patient counseled that medicine may cause skin irritation and to avoid applying near the eyes.  In the event of skin irritation, the patient was advised to reduce the amount of the drug applied or use it less frequently.   The patient verbalized understanding of the proper use and possible adverse effects of azelaic acid.  All of the patient's questions and concerns were addressed. Tranexamic Acid Pregnancy And Lactation Text: It is unknown if this medication is safe during pregnancy or breast feeding. Bactrim Counseling:  I discussed with the patient the risks of sulfa antibiotics including but not limited to GI upset, allergic reaction, drug rash, diarrhea, dizziness, photosensitivity, and yeast infections.  Rarely, more serious reactions can occur including but not limited to aplastic anemia, agranulocytosis, methemoglobinemia, blood dyscrasias, liver or kidney failure, lung infiltrates or desquamative/blistering drug rashes. Enbrel Pregnancy And Lactation Text: This medication is Pregnancy Category B and is considered safe during pregnancy. It is unknown if this medication is excreted in breast milk. Cyclosporine Pregnancy And Lactation Text: This medication is Pregnancy Category C and it isn't know if it is safe during pregnancy. This medication is excreted in breast milk. Zyclara Counseling:  I discussed with the patient the risks of imiquimod including but not limited to erythema, scaling, itching, weeping, crusting, and pain.  Patient understands that the inflammatory response to imiquimod is variable from person to person and was educated regarded proper titration schedule.  If flu-like symptoms develop, patient knows to discontinue the medication and contact us. Cosentyx Counseling:  I discussed with the patient the risks of Cosentyx including but not limited to worsening of Crohn's disease, immunosuppression, allergic reactions and infections.  The patient understands that monitoring is required including a PPD at baseline and must alert us or the primary physician if symptoms of infection or other concerning signs are noted. Cyclosporine Counseling:  I discussed with the patient the risks of cyclosporine including but not limited to hypertension, gingival hyperplasia,myelosuppression, immunosuppression, liver damage, kidney damage, neurotoxicity, lymphoma, and serious infections. The patient understands that monitoring is required including baseline blood pressure, CBC, CMP, lipid panel and uric acid, and then 1-2 times monthly CMP and blood pressure. Odomzo Counseling- I discussed with the patient the risks of Odomzo including but not limited to nausea, vomiting, diarrhea, constipation, weight loss, changes in the sense of taste, decreased appetite, muscle spasms, and hair loss.  The patient verbalized understanding of the proper use and possible adverse effects of Odomzo.  All of the patient's questions and concerns were addressed. Sarecycline Counseling: Patient advised regarding possible photosensitivity and discoloration of the teeth, skin, lips, tongue and gums.  Patient instructed to avoid sunlight, if possible.  When exposed to sunlight, patients should wear protective clothing, sunglasses, and sunscreen.  The patient was instructed to call the office immediately if the following severe adverse effects occur:  hearing changes, easy bruising/bleeding, severe headache, or vision changes.  The patient verbalized understanding of the proper use and possible adverse effects of sarecycline.  All of the patient's questions and concerns were addressed. Hydroquinone Counseling:  Patient advised that medication may result in skin irritation, lightening (hypopigmentation), dryness, and burning.  In the event of skin irritation, the patient was advised to reduce the amount of the drug applied or use it less frequently.  Rarely, spots that are treated with hydroquinone can become darker (pseudoochronosis).  Should this occur, patient instructed to stop medication and call the office. The patient verbalized understanding of the proper use and possible adverse effects of hydroquinone.  All of the patient's questions and concerns were addressed. Doxepin Counseling:  Patient advised that the medication is sedating and not to drive a car after taking this medication. Patient informed of potential adverse effects including but not limited to dry mouth, urinary retention, and blurry vision.  The patient verbalized understanding of the proper use and possible adverse effects of doxepin.  All of the patient's questions and concerns were addressed. Rhofade Counseling: Rhofade is a topical medication which can decrease superficial blood flow where applied. Side effects are uncommon and include stinging, redness and allergic reactions. Valtrex Counseling: I discussed with the patient the risks of valacyclovir including but not limited to kidney damage, nausea, vomiting and severe allergy.  The patient understands that if the infection seems to be worsening or is not improving, they are to call. Bactrim Pregnancy And Lactation Text: This medication is Pregnancy Category D and is known to cause fetal risk.  It is also excreted in breast milk. Methotrexate Counseling:  Patient counseled regarding adverse effects of methotrexate including but not limited to nausea, vomiting, abnormalities in liver function tests. Patients may develop mouth sores, rash, diarrhea, and abnormalities in blood counts. The patient understands that monitoring is required including LFT's and blood counts.  There is a rare possibility of scarring of the liver and lung problems that can occur when taking methotrexate. Persistent nausea, loss of appetite, pale stools, dark urine, cough, and shortness of breath should be reported immediately. Patient advised to discontinue methotrexate treatment at least three months before attempting to become pregnant.  I discussed the need for folate supplements while taking methotrexate.  These supplements can decrease side effects during methotrexate treatment. The patient verbalized understanding of the proper use and possible adverse effects of methotrexate.  All of the patient's questions and concerns were addressed. Stelara Counseling:  I discussed with the patient the risks of ustekinumab including but not limited to immunosuppression, malignancy, posterior leukoencephalopathy syndrome, and serious infections.  The patient understands that monitoring is required including a PPD at baseline and must alert us or the primary physician if symptoms of infection or other concerning signs are noted. Humira Counseling:  I discussed with the patient the risks of adalimumab including but not limited to myelosuppression, immunosuppression, autoimmune hepatitis, demyelinating diseases, lymphoma, and serious infections.  The patient understands that monitoring is required including a PPD at baseline and must alert us or the primary physician if symptoms of infection or other concerning signs are noted. Imiquimod Counseling:  I discussed with the patient the risks of imiquimod including but not limited to erythema, scaling, itching, weeping, crusting, and pain.  Patient understands that the inflammatory response to imiquimod is variable from person to person and was educated regarded proper titration schedule.  If flu-like symptoms develop, patient knows to discontinue the medication and contact us. Solaraze Counseling:  I discussed with the patient the risks of Solaraze including but not limited to erythema, scaling, itching, weeping, crusting, and pain. Oral Minoxidil Counseling- I discussed with the patient the risks of oral minoxidil including but not limited to shortness of breath, swelling of the feet or ankles, dizziness, lightheadedness, unwanted hair growth and allergic reaction.  The patient verbalized understanding of the proper use and possible adverse effects of oral minoxidil.  All of the patient's questions and concerns were addressed. Doxepin Pregnancy And Lactation Text: This medication is Pregnancy Category C and it isn't known if it is safe during pregnancy. It is also excreted in breast milk and breast feeding isn't recommended. Methotrexate Pregnancy And Lactation Text: This medication is Pregnancy Category X and is known to cause fetal harm. This medication is excreted in breast milk. Opioid Counseling: I discussed with the patient the potential side effects of opioids including but not limited to addiction, altered mental status, and depression. I stressed avoiding alcohol, benzodiazepines, muscle relaxants and sleep aids unless specifically okayed by a physician. The patient verbalized understanding of the proper use and possible adverse effects of opioids. All of the patient's questions and concerns were addressed. They were instructed to flush the remaining pills down the toilet if they did not need them for pain. Oral Minoxidil Pregnancy And Lactation Text: This medication should only be used when clearly needed if you are pregnant, attempting to become pregnant or breast feeding. Hydroxyzine Pregnancy And Lactation Text: This medication is not safe during pregnancy and should not be taken. It is also excreted in breast milk and breast feeding isn't recommended. Solaraze Pregnancy And Lactation Text: This medication is Pregnancy Category B and is considered safe. There is some data to suggest avoiding during the third trimester. It is unknown if this medication is excreted in breast milk. Erivedge Counseling- I discussed with the patient the risks of Erivedge including but not limited to nausea, vomiting, diarrhea, constipation, weight loss, changes in the sense of taste, decreased appetite, muscle spasms, and hair loss.  The patient verbalized understanding of the proper use and possible adverse effects of Erivedge.  All of the patient's questions and concerns were addressed. Taltz Counseling: I discussed with the patient the risks of ixekizumab including but not limited to immunosuppression, serious infections, worsening of inflammatory bowel disease and drug reactions.  The patient understands that monitoring is required including a PPD at baseline and must alert us or the primary physician if symptoms of infection or other concerning signs are noted. Tetracycline Counseling: Patient counseled regarding possible photosensitivity and increased risk for sunburn.  Patient instructed to avoid sunlight, if possible.  When exposed to sunlight, patients should wear protective clothing, sunglasses, and sunscreen.  The patient was instructed to call the office immediately if the following severe adverse effects occur:  hearing changes, easy bruising/bleeding, severe headache, or vision changes.  The patient verbalized understanding of the proper use and possible adverse effects of tetracycline.  All of the patient's questions and concerns were addressed. Patient understands to avoid pregnancy while on therapy due to potential birth defects. Hydroxyzine Counseling: Patient advised that the medication is sedating and not to drive a car after taking this medication.  Patient informed of potential adverse effects including but not limited to dry mouth, urinary retention, and blurry vision.  The patient verbalized understanding of the proper use and possible adverse effects of hydroxyzine.  All of the patient's questions and concerns were addressed. Benzoyl Peroxide Counseling: Patient counseled that medicine may cause skin irritation and bleach clothing.  In the event of skin irritation, the patient was advised to reduce the amount of the drug applied or use it less frequently.   The patient verbalized understanding of the proper use and possible adverse effects of benzoyl peroxide.  All of the patient's questions and concerns were addressed. Cephalexin Counseling: I counseled the patient regarding use of cephalexin as an antibiotic for prophylactic and/or therapeutic purposes. Cephalexin (commonly prescribed under brand name Keflex) is a cephalosporin antibiotic which is active against numerous classes of bacteria, including most skin bacteria. Side effects may include nausea, diarrhea, gastrointestinal upset, rash, hives, yeast infections, and in rare cases, hepatitis, kidney disease, seizures, fever, confusion, neurologic symptoms, and others. Patients with severe allergies to penicillin medications are cautioned that there is about a 10% incidence of cross-reactivity with cephalosporins. When possible, patients with penicillin allergies should use alternatives to cephalosporins for antibiotic therapy. Valtrex Pregnancy And Lactation Text: this medication is Pregnancy Category B and is considered safe during pregnancy. This medication is not directly found in breast milk but it's metabolite acyclovir is present. Otezla Counseling: The side effects of Otezla were discussed with the patient, including but not limited to worsening or new depression, weight loss, diarrhea, nausea, upper respiratory tract infection, and headache. Patient instructed to call the office should any adverse effect occur.  The patient verbalized understanding of the proper use and possible adverse effects of Otezla.  All the patient's questions and concerns were addressed. Topical Retinoid counseling:  Patient advised to apply a pea-sized amount only at bedtime and wait 30 minutes after washing their face before applying.  If too drying, patient may add a non-comedogenic moisturizer. The patient verbalized understanding of the proper use and possible adverse effects of retinoids.  All of the patient's questions and concerns were addressed. Opioid Pregnancy And Lactation Text: These medications can lead to premature delivery and should be avoided during pregnancy. These medications are also present in breast milk in small amounts. Finasteride Counseling:  I discussed with the patient the risks of use of finasteride including but not limited to decreased libido, decreased ejaculate volume, gynecomastia, and depression. Women should not handle medication.  All of the patient's questions and concerns were addressed. Ilumya Counseling: I discussed with the patient the risks of tildrakizumab including but not limited to immunosuppression, malignancy, posterior leukoencephalopathy syndrome, and serious infections.  The patient understands that monitoring is required including a PPD at baseline and must alert us or the primary physician if symptoms of infection or other concerning signs are noted. Carac Counseling:  I discussed with the patient the risks of Carac including but not limited to erythema, scaling, itching, weeping, crusting, and pain. Klisyri Counseling:  I discussed with the patient the risks of Klisyri including but not limited to erythema, scaling, itching, weeping, crusting, and pain. Benzoyl Peroxide Pregnancy And Lactation Text: This medication is Pregnancy Category C. It is unknown if benzoyl peroxide is excreted in breast milk. Cephalexin Pregnancy And Lactation Text: This medication is Pregnancy Category B and considered safe during pregnancy.  It is also excreted in breast milk but can be used safely for shorter doses. Prednisone Counseling:  I discussed with the patient the risks of prolonged use of prednisone including but not limited to weight gain, insomnia, osteoporosis, mood changes, diabetes, susceptibility to infection, glaucoma and high blood pressure.  In cases where prednisone use is prolonged, patients should be monitored with blood pressure checks, serum glucose levels and an eye exam.  Additionally, the patient may need to be placed on GI prophylaxis, PCP prophylaxis, and calcium and vitamin D supplementation and/or a bisphosphonate.  The patient verbalized understanding of the proper use and the possible adverse effects of prednisone.  All of the patient's questions and concerns were addressed. Albendazole Counseling:  I discussed with the patient the risks of albendazole including but not limited to cytopenia, kidney damage, nausea/vomiting and severe allergy.  The patient understands that this medication is being used in an off-label manner. Finasteride Pregnancy And Lactation Text: This medication is absolutely contraindicated during pregnancy. It is unknown if it is excreted in breast milk. Klisyri Pregnancy And Lactation Text: It is unknown if this medication can harm a developing fetus or if it is excreted in breast milk. Fluconazole Counseling:  Patient counseled regarding adverse effects of fluconazole including but not limited to headache, diarrhea, nausea, upset stomach, liver function test abnormalities, taste disturbance, and stomach pain.  There is a rare possibility of liver failure that can occur when taking fluconazole.  The patient understands that monitoring of LFTs and kidney function test may be required, especially at baseline. The patient verbalized understanding of the proper use and possible adverse effects of fluconazole.  All of the patient's questions and concerns were addressed. Otezla Pregnancy And Lactation Text: This medication is Pregnancy Category C and it isn't known if it is safe during pregnancy. It is unknown if it is excreted in breast milk. Infliximab Counseling:  I discussed with the patient the risks of infliximab including but not limited to myelosuppression, immunosuppression, autoimmune hepatitis, demyelinating diseases, lymphoma, and serious infections.  The patient understands that monitoring is required including a PPD at baseline and must alert us or the primary physician if symptoms of infection or other concerning signs are noted. Clindamycin Counseling: I counseled the patient regarding use of clindamycin as an antibiotic for prophylactic and/or therapeutic purposes. Clindamycin is active against numerous classes of bacteria, including skin bacteria. Side effects may include nausea, diarrhea, gastrointestinal upset, rash, hives, yeast infections, and in rare cases, colitis. Tremfya Counseling: I discussed with the patient the risks of guselkumab including but not limited to immunosuppression, serious infections, and drug reactions.  The patient understands that monitoring is required including a PPD at baseline and must alert us or the primary physician if symptoms of infection or other concerning signs are noted. Oxybutynin Counseling:  I discussed with the patient the risks of oxybutynin including but not limited to skin rash, drowsiness, dry mouth, difficulty urinating, and blurred vision. Minoxidil Counseling: Minoxidil is a topical medication which can increase blood flow where it is applied. It is uncertain how this medication increases hair growth. Side effects are uncommon and include stinging and allergic reactions. Acitretin Counseling:  I discussed with the patient the risks of acitretin including but not limited to hair loss, dry lips/skin/eyes, liver damage, hyperlipidemia, depression/suicidal ideation, photosensitivity.  Serious rare side effects can include but are not limited to pancreatitis, pseudotumor cerebri, bony changes, clot formation/stroke/heart attack.  Patient understands that alcohol is contraindicated since it can result in liver toxicity and significantly prolong the elimination of the drug by many years. Gabapentin Counseling: I discussed with the patient the risks of gabapentin including but not limited to dizziness, somnolence, fatigue and ataxia. Clindamycin Pregnancy And Lactation Text: This medication can be used in pregnancy if certain situations. Clindamycin is also present in breast milk. Tazorac Counseling:  Patient advised that medication is irritating and drying.  Patient may need to apply sparingly and wash off after an hour before eventually leaving it on overnight.  The patient verbalized understanding of the proper use and possible adverse effects of tazorac.  All of the patient's questions and concerns were addressed. Arava Counseling:  Patient counseled regarding adverse effects of Arava including but not limited to nausea, vomiting, abnormalities in liver function tests. Patients may develop mouth sores, rash, diarrhea, and abnormalities in blood counts. The patient understands that monitoring is required including LFTs and blood counts.  There is a rare possibility of scarring of the liver and lung problems that can occur when taking methotrexate. Persistent nausea, loss of appetite, pale stools, dark urine, cough, and shortness of breath should be reported immediately. Patient advised to discontinue Arava treatment and consult with a physician prior to attempting conception. The patient will have to undergo a treatment to eliminate Arava from the body prior to conception. Olumiant Counseling: I discussed with the patient the risks of Olumiant therapy including but not limited to upper respiratory tract infections, shingles, cold sores, and nausea. Live vaccines should be avoided.  This medication has been linked to serious infections; higher rate of mortality; malignancy and lymphoproliferative disorders; major adverse cardiovascular events; thrombosis; gastrointestinal perforations; neutropenia; lymphopenia; anemia; liver enzyme elevations; and lipid elevations. Griseofulvin Counseling:  I discussed with the patient the risks of griseofulvin including but not limited to photosensitivity, cytopenia, liver damage, nausea/vomiting and severe allergy.  The patient understands that this medication is best absorbed when taken with a fatty meal (e.g., ice cream or french fries). Doxycycline Pregnancy And Lactation Text: This medication is Pregnancy Category D and not consider safe during pregnancy. It is also excreted in breast milk but is considered safe for shorter treatment courses. Calcipotriene Pregnancy And Lactation Text: This medication has not been proven safe during pregnancy. It is unknown if this medication is excreted in breast milk. Xeljanz Counseling: I discussed with the patient the risks of Xeljanz therapy including increased risk of infection, liver issues, headache, diarrhea, or cold symptoms. Live vaccines should be avoided. They were instructed to call if they have any problems. Xeldwaynez Pregnancy And Lactation Text: This medication is Pregnancy Category D and is not considered safe during pregnancy.  The risk during breast feeding is also uncertain. Doxycycline Counseling:  Patient counseled regarding possible photosensitivity and increased risk for sunburn.  Patient instructed to avoid sunlight, if possible.  When exposed to sunlight, patients should wear protective clothing, sunglasses, and sunscreen.  The patient was instructed to call the office immediately if the following severe adverse effects occur:  hearing changes, easy bruising/bleeding, severe headache, or vision changes.  The patient verbalized understanding of the proper use and possible adverse effects of doxycycline.  All of the patient's questions and concerns were addressed. Tazorac Pregnancy And Lactation Text: This medication is not safe during pregnancy. It is unknown if this medication is excreted in breast milk. Ivermectin Counseling:  Patient instructed to take medication on an empty stomach with a full glass of water.  Patient informed of potential adverse effects including but not limited to nausea, diarrhea, dizziness, itching, and swelling of the extremities or lymph nodes.  The patient verbalized understanding of the proper use and possible adverse effects of ivermectin.  All of the patient's questions and concerns were addressed.

## 2025-04-12 NOTE — DISCHARGE NOTE PROVIDER - PROVIDER TOKENS
PROVIDER:[TOKEN:[[id number]],FOLLOWUP:[1 week]],FREE:[LAST:[Your Primary Care Provider],PHONE:[(   )    -],FAX:[(   )    -],FOLLOWUP:[1 week]],PROVIDER:[TOKEN:[[id number]],FOLLOWUP:[1 week]] PROVIDER:[TOKEN:[[id number]],FOLLOWUP:[1 week]],FREE:[LAST:[Your Primary Care Provider],PHONE:[(   )    -],FAX:[(   )    -],FOLLOWUP:[1 week]],PROVIDER:[TOKEN:[[id number]],FOLLOWUP:[1 week]]

## 2025-04-12 NOTE — DISCHARGE NOTE PROVIDER - NSDCCPTREATMENT_GEN_ALL_CORE_FT
PRINCIPAL PROCEDURE  Procedure: CT scan  Findings and Treatment: PROCEDURE:  CT of the Abdomen and Pelvis was performed.  Sagittal and coronal reformats were performed.  FINDINGS:  LOWE  < end of copied text >  R CHEST: Minimal bibasilar dependent atelectasis.  LIVER: Within normal limits.  BILE DUCTS: Normal caliber.  GALLBLADDER: Within normal limits.  SPLEEN: Within normal limits.  PANCREAS: Within normal limits.  ADRENALS: Within normal limits.  KIDNEYS/URETERS: Subcentimeter hypodense focus in the right kidney that   is too small to characterize. No hydronephrosis.  BLADDER: Underdistended. Mild perivesical fat infiltration.  REPRODUCTIVE ORGANS: Enlarged and heterogeneous prostate. No evidence of   intraprostatic abscess. Mild fat infiltration around the seminal vesicles.  BOWEL: No bowel obstruction. Appendix is normal. Mild perirectal fat   infiltration.  PERITONEUM/RETROPERITONEUM: Trace free peritoneal fluid in the right   greater than left lower quadrant and trace ill-defined presacral fluid.   No organized collection. No free air.  VESSELS: Within normal limits.  LYMPH NODES: No lymphadenopathy.  ABDOMINAL WALL: Within normal limits.  BONES: Degenerative changes.  IMPRESSION:  *  Mild fat infiltration around the rectum, seminal vesicles, and urinary   bladder, possibly secondary to postprocedural inflammation versus   infection, i.e. proctitis, seminal vesiculitis, or cystitis.  *  Trace free peritoneal fluid in the pelvis and trace ill-defined   presacral fluid. No organized intra-abdominal collection.< from: CT Abdomen and Pelvis w/ IV Cont (04.11.25 @ 13:24) >

## 2025-04-12 NOTE — PROGRESS NOTE ADULT - PROBLEM SELECTOR PLAN 3
Cr elevated to 1.59 on admission; Feb 2025 outpatient labwork with Cr 1.15 (PCP at [facility], records on pt phone). S/p 2L NS in ED  - C/w mIVF - LR @ 100cc/hr  - Monitor UOP  - cr trending down, Trend SCr  - avoid nephrotoxics

## 2025-04-12 NOTE — DISCHARGE NOTE PROVIDER - NSDCCPCAREPLAN_GEN_ALL_CORE_FT
PRINCIPAL DISCHARGE DIAGNOSIS  Diagnosis: Gram-negative bacteremia  Assessment and Plan of Treatment:       SECONDARY DISCHARGE DIAGNOSES  Diagnosis: HLD (hyperlipidemia)  Assessment and Plan of Treatment:     Diagnosis: RHIANNON (acute kidney injury)  Assessment and Plan of Treatment:     Diagnosis: BPH with elevated PSA  Assessment and Plan of Treatment:     Diagnosis: HTN (hypertension)  Assessment and Plan of Treatment:      PRINCIPAL DISCHARGE DIAGNOSIS  Diagnosis: Gram-negative bacteremia  Assessment and Plan of Treatment: You were admitted with gram negative bacteremia from the other facility . Blood culture and urine culture repeated were negative . You were treated with antibiotics with improvement . You were evaluated by infectious disease , CT scan negative for abscess . Continue antibiotics as directed to complete course      SECONDARY DISCHARGE DIAGNOSES  Diagnosis: HLD (hyperlipidemia)  Assessment and Plan of Treatment: You were noted to have LFTs mildly elevated , may be secondary to sepsis , Please  hold Crestor for now   recommend repeat labs with PMD for LFT, can resume crestor if LFT improved    Diagnosis: RHIANNON (acute kidney injury)  Assessment and Plan of Treatment: You were noted to have abnormal renal function liley secondary to sepsis , improved with iv fluids . Follow up with PMD    Diagnosis: BPH with elevated PSA  Assessment and Plan of Treatment: Follow up with your urologist for biopsy report and further management as outpatient    Diagnosis: HTN (hypertension)  Assessment and Plan of Treatment: Continue blood pressure medication regimen as directed. Monitor for any visual changes, headaches or dizziness.  Monitor blood pressure regularly.  Follow up with your PCP for further management for high blood pressure.

## 2025-04-12 NOTE — DISCHARGE NOTE PROVIDER - CARE PROVIDERS DIRECT ADDRESSES
[email or web address]

## 2025-04-12 NOTE — PROGRESS NOTE ADULT - PROBLEM SELECTOR PLAN 2
Reporting onset of hematuria since transrectal biopsy, likely iso procedure. H/H 13/39.4 on admission, hemodynamically stable  now resolved   - Monitor urine   - Trend CBC  - On home aspirin (unclear indication), no other blood thinners - will c/w ASA for now Reporting onset of hematuria since transrectal biopsy, likely iso procedure. H/H 13/39.4 on admission, hemodynamically stable  now resolved   - Monitor urine   - Trend CBC, H/H  stable   - On home aspirin (unclear indication), no other blood thinners - will c/w ASA for now

## 2025-04-12 NOTE — PROGRESS NOTE ADULT - PROBLEM SELECTOR PLAN 7
DVT ppx - hold chemical ppx iso hematuria, OOB   Diet - DASH, no lactose/dairy, no eggs, no meat  Dispo - pending clinical improvement   plan of care d/w pt and wife at bedside DVT ppx - hold chemical ppx iso hematuria, OOB   Diet - DASH, no lactose/dairy, no eggs, no meat  Dispo - pending clinical improvement   plan of care d/w pt and family  at bedside  DC planning in am if afebrile    case d/w ACP

## 2025-04-12 NOTE — DISCHARGE NOTE PROVIDER - CARE PROVIDER_API CALL
Sammy Johnson  Nephrology  [address], [address]  [place] [address]  Phone: [phone]  Fax: [phone]  Follow Up Time: 1 week    Your Primary Care Provider,   Phone: (   )    -  Fax: (   )    -  Follow Up Time: 1 week    Amos Jackson  Urology  [address], [address]  [place] [address]  Phone: [phone]  Fax: [phone]  Follow Up Time: 1 week   Sammy Johnson  Nephrology  [address], [address]  [place] [address]  Phone: [phone]  Fax: [phone]  Follow Up Time: 1 week    Your Primary Care Provider,   Phone: (   )    -  Fax: (   )    -  Follow Up Time: 1 week    Tung Gresham  Urology  [address], [address]  [place] [address]  Phone: [phone]  Fax: [phone]  Follow Up Time: 1 week

## 2025-04-12 NOTE — PROGRESS NOTE ADULT - SUBJECTIVE AND OBJECTIVE BOX
Patient is a 74y old  Male who presents with a chief complaint of gram negative bacteremia (11 Apr 2025 09:45)      SUBJECTIVE / OVERNIGHT EVENTS:    MEDICATIONS  (STANDING):  aspirin  chewable 81 milliGRAM(s) Oral daily  ertapenem  IVPB 1000 milliGRAM(s) IV Intermittent every 24 hours  influenza  Vaccine (HIGH DOSE) 0.5 milliLiter(s) IntraMuscular once  lactated ringers. 1000 milliLiter(s) (100 mL/Hr) IV Continuous <Continuous>  polyethylene glycol 3350 17 Gram(s) Oral daily  rosuvastatin 5 milliGRAM(s) Oral at bedtime  senna 2 Tablet(s) Oral at bedtime    MEDICATIONS  (PRN):  acetaminophen     Tablet .. 650 milliGRAM(s) Oral every 6 hours PRN Temp greater or equal to 38C (100.4F), Mild Pain (1 - 3)  ondansetron Injectable 4 milliGRAM(s) IV Push once PRN Nausea and/or Vomiting      Vital Signs Last 24 Hrs  T(C): 37.1 (12 Apr 2025 05:41), Max: 38.6 (11 Apr 2025 22:01)  T(F): 98.7 (12 Apr 2025 05:41), Max: 101.5 (11 Apr 2025 22:01)  HR: 62 (12 Apr 2025 05:41) (62 - 72)  BP: 129/60 (12 Apr 2025 05:41) (118/63 - 140/67)  BP(mean): --  RR: 16 (12 Apr 2025 05:41) (16 - 19)  SpO2: 100% (12 Apr 2025 05:41) (97% - 100%)    Parameters below as of 12 Apr 2025 05:41  Patient On (Oxygen Delivery Method): room air      CAPILLARY BLOOD GLUCOSE        I&O's Summary    11 Apr 2025 07:01  -  12 Apr 2025 07:00  --------------------------------------------------------  IN: 1600 mL / OUT: 750 mL / NET: 850 mL        PHYSICAL EXAM:  GENERAL: NAD, well-developed  HEAD:  Atraumatic, Normocephalic  EYES: EOMI, PERRLA, conjunctiva and sclera clear  NECK: Supple, No JVD  CHEST/LUNG: Clear to auscultation bilaterally; No wheeze  HEART: Regular rate and rhythm; No murmurs, rubs, or gallops  ABDOMEN: Soft, Nontender, Nondistended; Bowel sounds present  EXTREMITIES:  2+ Peripheral Pulses, No clubbing, cyanosis, or edema  PSYCH: AAOx3  NEUROLOGY: non-focal  SKIN: No rashes or lesions    LABS:                        13.4   3.69  )-----------( 147      ( 12 Apr 2025 05:49 )             40.2     04-12    137  |  104  |  12  ----------------------------<  84  4.5   |  21[L]  |  1.11    Ca    8.7      12 Apr 2025 05:49  Phos  3.4     04-12  Mg     2.10     04-12    TPro  5.8[L]  /  Alb  3.3  /  TBili  0.9  /  DBili  x   /  AST  63[H]  /  ALT  43[H]  /  AlkPhos  58  04-11          Urinalysis Basic - ( 12 Apr 2025 05:49 )    Color: x / Appearance: x / SG: x / pH: x  Gluc: 84 mg/dL / Ketone: x  / Bili: x / Urobili: x   Blood: x / Protein: x / Nitrite: x   Leuk Esterase: x / RBC: x / WBC x   Sq Epi: x / Non Sq Epi: x / Bacteria: x        RADIOLOGY & ADDITIONAL TESTS:    Imaging Personally Reviewed:    Consultant(s) Notes Reviewed:      Care Discussed with Consultants/Other Providers:   Patient is a 74y old  Male who presents with a chief complaint of gram negative bacteremia (11 Apr 2025 09:45)      SUBJECTIVE / OVERNIGHT EVENTS: patient seen and examined by bedside, pt alert and awake, reports feeling better , however pt was febrile to 101 overnight , pt reports minimal dysuria and slight hematuria ,  denies abdominal pain ,     MEDICATIONS  (STANDING):  aspirin  chewable 81 milliGRAM(s) Oral daily  ertapenem  IVPB 1000 milliGRAM(s) IV Intermittent every 24 hours  influenza  Vaccine (HIGH DOSE) 0.5 milliLiter(s) IntraMuscular once  lactated ringers. 1000 milliLiter(s) (100 mL/Hr) IV Continuous <Continuous>  polyethylene glycol 3350 17 Gram(s) Oral daily  rosuvastatin 5 milliGRAM(s) Oral at bedtime  senna 2 Tablet(s) Oral at bedtime    MEDICATIONS  (PRN):  acetaminophen     Tablet .. 650 milliGRAM(s) Oral every 6 hours PRN Temp greater or equal to 38C (100.4F), Mild Pain (1 - 3)  ondansetron Injectable 4 milliGRAM(s) IV Push once PRN Nausea and/or Vomiting      Vital Signs Last 24 Hrs  T(C): 37.1 (12 Apr 2025 05:41), Max: 38.6 (11 Apr 2025 22:01)  T(F): 98.7 (12 Apr 2025 05:41), Max: 101.5 (11 Apr 2025 22:01)  HR: 62 (12 Apr 2025 05:41) (62 - 72)  BP: 129/60 (12 Apr 2025 05:41) (118/63 - 140/67)  BP(mean): --  RR: 16 (12 Apr 2025 05:41) (16 - 19)  SpO2: 100% (12 Apr 2025 05:41) (97% - 100%)    Parameters below as of 12 Apr 2025 05:41  Patient On (Oxygen Delivery Method): room air      CAPILLARY BLOOD GLUCOSE        I&O's Summary    11 Apr 2025 07:01  -  12 Apr 2025 07:00  --------------------------------------------------------  IN: 1600 mL / OUT: 750 mL / NET: 850 mL    PHYSICAL EXAM:  GENERAL: NAD, well-developed  HEAD:  Atraumatic, Normocephalic  EYES: EOMI, PERRLA, conjunctiva and sclera clear  NECK: Supple, No JVD  CHEST/LUNG: Clear to auscultation bilaterally; No wheeze  HEART: Regular rate and rhythm; No murmurs, rubs, or gallops  ABDOMEN: Soft, Nontender, Nondistended; Bowel sounds present, no CVA tenderness   EXTREMITIES:  2+ Peripheral Pulses, No clubbing, cyanosis, or edema  PSYCH: AAOx3  NEUROLOGY: non-focal  SKIN: No rashes or lesions    LABS:                        13.4   3.69  )-----------( 147      ( 12 Apr 2025 05:49 )             40.2     04-12    137  |  104  |  12  ----------------------------<  84  4.5   |  21[L]  |  1.11    Ca    8.7      12 Apr 2025 05:49  Phos  3.4     04-12  Mg     2.10     04-12    TPro  5.8[L]  /  Alb  3.3  /  TBili  0.9  /  DBili  x   /  AST  63[H]  /  ALT  43[H]  /  AlkPhos  58  04-11          Urinalysis Basic - ( 12 Apr 2025 05:49 )    Color: x / Appearance: x / SG: x / pH: x  Gluc: 84 mg/dL / Ketone: x  / Bili: x / Urobili: x   Blood: x / Protein: x / Nitrite: x   Leuk Esterase: x / RBC: x / WBC x   Sq Epi: x / Non Sq Epi: x / Bacteria: x        RADIOLOGY & ADDITIONAL TESTS:  < from: CT Abdomen and Pelvis w/ IV Cont (04.11.25 @ 13:24) >    FINDINGS:  LOWER CHEST: Minimal bibasilar dependent atelectasis.    LIVER: Within normal limits.  BILE DUCTS: Normal caliber.  GALLBLADDER: Within normal limits.  SPLEEN: Within normal limits.  PANCREAS: Within normal limits.  ADRENALS: Within normal limits.  KIDNEYS/URETERS: Subcentimeter hypodense focus in the right kidney that   is too small to characterize. No hydronephrosis.    BLADDER: Underdistended. Mild perivesical fat infiltration.  REPRODUCTIVE ORGANS: Enlarged and heterogeneous prostate. No evidence of   intraprostatic abscess. Mild fat infiltration around the seminal vesicles.    BOWEL: No bowel obstruction. Appendix is normal. Mild perirectal fat   infiltration.  PERITONEUM/RETROPERITONEUM: Trace free peritoneal fluid in the right   greater than left lower quadrant and trace ill-defined presacral fluid.   No organized collection. No free air.  VESSELS: Within normal limits.  LYMPH NODES: No lymphadenopathy.  ABDOMINAL WALL: Within normal limits.  BONES: Degenerative changes.    IMPRESSION:  *  Mild fat infiltration around the rectum, seminal vesicles, and urinary   bladder, possibly secondary to postprocedural inflammation versus   infection, i.e. proctitis, seminal vesiculitis, or cystitis.  *  Trace free peritoneal fluid in the pelvis and trace ill-defined   presacral fluid. No organized intra-abdominal collection.    < end of copied text >    Imaging Personally Reviewed:    Consultant(s) Notes Reviewed:      Care Discussed with Consultants/Other Providers:

## 2025-04-12 NOTE — DISCHARGE NOTE PROVIDER - NSDCMRMEDTOKEN_GEN_ALL_CORE_FT
aspirin 81 mg oral tablet: 1 tab(s) orally once a day  losartan 50 mg oral tablet: 1 tab(s) orally once a day  Norvasc 10 mg oral tablet: 1 tab(s) orally once a day  rosuvastatin 5 mg oral tablet: 1 tab(s) orally once a day (at bedtime)   aspirin 81 mg oral tablet: 1 tab(s) orally once a day  ciprofloxacin 500 mg oral tablet: 1 tab(s) orally every 12 hours  losartan 50 mg oral tablet: 1 tab(s) orally once a day  Norvasc 10 mg oral tablet: 1 tab(s) orally once a day

## 2025-04-12 NOTE — PROGRESS NOTE ADULT - PROBLEM SELECTOR PLAN 1
Presenting as callback after [facility] blood cultures showed gram-negative bacteremia iso recent transrectal prostate biopsy (4/7/25, Dr. Tung Gresham), on Bactrim/Cefdinir pre- and post-procedure. ED visit to [facility] 4/8 for nausea/vomiting/chills- at [facility] CT scan of abdomen/pelvis read as no acute findings, no abscess/hematoma, mild stranding compatible with recent biopsy. Endorsing hematuria, mild dysuria following biopsy; suspect  source given history. On admission Tmax 102.8, WBC wnl; s/p Zosyn x1 in ED  pt admitted with sepsis and bacteremia   - on  Zosyn 3.375g q12 (dose adjusted for RHIANNON),  Blood cx with E coli , ID rec to change abx to Ertapenem and can be discharged on PO Cipro BID to complete 14 days course if CT scan negative for abscess   - F/u repeat blood cultures- NGTD   - UA w/ mod LE/blood, f/u urine culture also with no growth  - Urology following, appreciate recs  - Tylenol prn for fever  -ID eval requested for  abx management , recs appreciated Presenting as callback after [facility] blood cultures showed gram-negative bacteremia iso recent transrectal prostate biopsy (4/7/25, Dr. Tung Gresham), on Bactrim/Cefdinir pre- and post-procedure. ED visit to [facility] 4/8 for nausea/vomiting/chills- at [facility] CT scan of abdomen/pelvis read as no acute findings, no abscess/hematoma, mild stranding compatible with recent biopsy. Endorsing hematuria, mild dysuria following biopsy; suspect  source given history. On admission Tmax 102.8, WBC wnl; s/p Zosyn x1 in ED  pt admitted with sepsis and bacteremia   - on  Zosyn 3.375g q12 (dose adjusted for RHINANON),  Blood cx with E coli , ID rec to change abx to Ertapenem and can be discharged on PO Cipro BID to complete 14 days course if CT scan negative for abscess . CT scan noted as above ,  Mild fat infiltration around the rectum, seminal vesicles, and urinary bladder, possibly secondary to postprocedural inflammation versus  infection, i.e. proctitis, seminal vesiculitis, or cystitis.  - F/u repeat blood cultures- NGTD    - will f/u with ID for abx for dc planning if pt afebrile   - UA w/ mod LE/blood, f/u urine culture also with no growth  - Urology following, appreciate recs  - Tylenol prn for fever  -ID eval requested for  abx management , recs appreciated

## 2025-04-12 NOTE — DISCHARGE NOTE PROVIDER - HOSPITAL COURSE
74M with PMH HTN, BPH, elevated PSA, pre-DM, nephrolithiasis p/w hematuria, dysuria iso recent transrectal prostate biopsy (4/7/25) with gram-negative bacteremia on 4/8/25 blood cultures (from [facility] ED), admitted to medicine for further management. Urology following    : Gram-negative bacteremia.   ·  Plan: Presenting as callback after [facility] blood cultures showed gram-negative bacteremia iso recent transrectal prostate biopsy (4/7/25, Dr. Tung Gresham), on Bactrim/Cefdinir pre- and post-procedure. ED visit to [facility] 4/8 for nausea/vomiting/chills- at [facility] CT scan of abdomen/pelvis read as no acute findings, no abscess/hematoma, mild stranding compatible with recent biopsy. Endorsing hematuria, mild dysuria following biopsy; suspect  source given history. On admission Tmax 102.8, WBC wnl; s/p Zosyn x1 in ED  pt admitted with sepsis and bacteremia   - on  Zosyn 3.375g q12 (dose adjusted for RHIANNON),  Blood cx with E coli , ID rec to change abx to Ertapenem and can be discharged on PO Cipro BID to complete 14 days course if CT scan negative for abscess . CT scan noted as above ,  Mild fat infiltration around the rectum, seminal vesicles, and urinary bladder, possibly secondary to postprocedural inflammation versus  infection, i.e. proctitis, seminal vesiculitis, or cystitis.  - F/u repeat blood cultures- NGTD    - will f/u with ID for abx for dc planning if pt afebrile   - UA w/ mod LE/blood, f/u urine culture also with no growth  - Urology following, appreciate recs  - Tylenol prn for fever  -ID eval requested for  abx management , recs appreciated.    Hematuria.   ·  Plan: Reporting onset of hematuria since transrectal biopsy, likely iso procedure. H/H 13/39.4 on admission, hemodynamically stable  now resolved   - Monitor urine   - Trend CBC, H/H  stable   - On home aspirin (unclear indication), no other blood thinners - will c/w ASA for now.    RHIANNON (acute kidney injury).   ·  Plan: Cr elevated to 1.59 on admission; Feb 2025 outpatient labwork with Cr 1.15 (PCP at [facility], records on pt phone). S/p 2L NS in ED  - C/w mIVF - LR @ 100cc/hr  - Monitor UOP  - cr trending down, Trend SCr  - avoid nephrotoxics.     BPH with elevated PSA.   ·  Plan: S/p transrectal biopsy 4/7 for elevated PSA w/ family history (brother) of prostate cancer    - F/u biopsy results outpatient   - Urology following, appreciate further recommendations.     HTN (hypertension).   ·  Plan: Home medications: Losartan 50mg qd, Norvasc 10mg qd    - Hold Losartan iso RHIANNON  - Hold Norvasc, resume as tolerated.    : HLD (hyperlipidemia).   ·  Plan: - C/w Crestor 5mg qhs (home med)  LFTs mildly elevated , may be secondary to sepsis    will CTM, if worsening will hold statin.     74M with PMH HTN, BPH, elevated PSA, pre-DM, nephrolithiasis p/w hematuria, dysuria iso recent transrectal prostate biopsy (4/7/25) with gram-negative bacteremia on 4/8/25 blood cultures (from [facility] ED), admitted to medicine for further management. Urology following    : Gram-negative bacteremia.    Presenting as callback after [facility] blood cultures showed gram-negative bacteremia iso recent transrectal prostate biopsy (4/7/25, Dr. Tung Gresham), on Bactrim/Cefdinir pre- and post-procedure. ED visit to [facility] 4/8 for nausea/vomiting/chills- at [facility] CT scan of abdomen/pelvis read as no acute findings, no abscess/hematoma, mild stranding compatible with recent biopsy. Endorsing hematuria, mild dysuria following biopsy; suspect  source given history. On admission Tmax 102.8, WBC wnl; s/p Zosyn x1 in ED  pt admitted with sepsis and bacteremia   - on  Zosyn 3.375g q12 (dose adjusted for RHIANNON),  Blood cx with E coli , ID rec to change abx to Ertapenem and can be discharged on PO Cipro BID to complete 14 days course if CT scan negative for abscess . CT scan noted as above ,  Mild fat infiltration around the rectum, seminal vesicles, and urinary bladder, possibly secondary to postprocedural inflammation versus  infection, i.e. proctitis, seminal vesiculitis, or cystitis.  - F/u repeat blood cultures- NGTD    - will f/u with ID for abx for dc planning if pt afebrile   - UA w/ mod LE/blood, f/u urine culture also with no growth  - Urology following, appreciate recs  - Tylenol prn for fever  -ID eval requested for  abx management , recs appreciated.    Hematuria.   ·  Plan: Reporting onset of hematuria since transrectal biopsy, likely iso procedure. H/H 13/39.4 on admission, hemodynamically stable  now resolved   - Monitor urine   - Trend CBC, H/H  stable   - On home aspirin (unclear indication), no other blood thinners - will c/w ASA for now.    RHIANNON (acute kidney injury).   ·  Plan: Cr elevated to 1.59 on admission; Feb 2025 outpatient labwork with Cr 1.15 (PCP at [facility], records on pt phone). S/p 2L NS in ED  - s/p IVF - LR @ 100cc/hr  - Monitor UOP  - RHIANNON resolved   - avoid nephrotoxics.     BPH with elevated PSA.   ·  Plan: S/p transrectal biopsy 4/7 for elevated PSA w/ family history (brother) of prostate cancer    - F/u biopsy results outpatient   - Urology following, appreciate further recommendations.     HTN (hypertension).   ·  Plan: Home medications: Losartan 50mg qd, Norvasc 10mg qd    - Hold Losartan iso RHIANNON  - Hold Norvasc resumed, can resume Losartan as RHIANNON resolved .    : HLD (hyperlipidemia).   ·  Plan: - C/w Crestor 5mg qhs (home med)  LFTs mildly elevated , may be secondary to sepsis    hold Crestor   recommend repeat labs with PMD for LFT, can resume crestor if LFT wnl.    Patient hemodynamically stable for discharge home  Time spent in discharge process is 35 min  plan of care d/w pt and wife  at bedside  case d/w ACP.

## 2025-04-13 VITALS
SYSTOLIC BLOOD PRESSURE: 122 MMHG | RESPIRATION RATE: 17 BRPM | TEMPERATURE: 99 F | OXYGEN SATURATION: 99 % | DIASTOLIC BLOOD PRESSURE: 77 MMHG | HEART RATE: 75 BPM

## 2025-04-13 LAB
ADD ON TEST-SPECIMEN IN LAB: SIGNIFICANT CHANGE UP
ALBUMIN SERPL ELPH-MCNC: 3.5 G/DL — SIGNIFICANT CHANGE UP (ref 3.3–5)
ALP SERPL-CCNC: 77 U/L — SIGNIFICANT CHANGE UP (ref 40–120)
ALT FLD-CCNC: 74 U/L — HIGH (ref 4–41)
ANION GAP SERPL CALC-SCNC: 12 MMOL/L — SIGNIFICANT CHANGE UP (ref 7–14)
ANION GAP SERPL CALC-SCNC: 13 MMOL/L — SIGNIFICANT CHANGE UP (ref 7–14)
AST SERPL-CCNC: 88 U/L — HIGH (ref 4–40)
BASOPHILS # BLD AUTO: 0.04 K/UL — SIGNIFICANT CHANGE UP (ref 0–0.2)
BASOPHILS NFR BLD AUTO: 1.3 % — SIGNIFICANT CHANGE UP (ref 0–2)
BILIRUB DIRECT SERPL-MCNC: <0.2 MG/DL — SIGNIFICANT CHANGE UP (ref 0–0.3)
BILIRUB INDIRECT FLD-MCNC: >0.3 MG/DL — SIGNIFICANT CHANGE UP (ref 0–1)
BILIRUB SERPL-MCNC: 0.5 MG/DL — SIGNIFICANT CHANGE UP (ref 0.2–1.2)
BUN SERPL-MCNC: 11 MG/DL — SIGNIFICANT CHANGE UP (ref 7–23)
BUN SERPL-MCNC: 12 MG/DL — SIGNIFICANT CHANGE UP (ref 7–23)
CALCIUM SERPL-MCNC: 8.8 MG/DL — SIGNIFICANT CHANGE UP (ref 8.4–10.5)
CALCIUM SERPL-MCNC: 9 MG/DL — SIGNIFICANT CHANGE UP (ref 8.4–10.5)
CHLORIDE SERPL-SCNC: 103 MMOL/L — SIGNIFICANT CHANGE UP (ref 98–107)
CHLORIDE SERPL-SCNC: 104 MMOL/L — SIGNIFICANT CHANGE UP (ref 98–107)
CO2 SERPL-SCNC: 20 MMOL/L — LOW (ref 22–31)
CO2 SERPL-SCNC: 21 MMOL/L — LOW (ref 22–31)
CREAT SERPL-MCNC: 1.06 MG/DL — SIGNIFICANT CHANGE UP (ref 0.5–1.3)
CREAT SERPL-MCNC: 1.08 MG/DL — SIGNIFICANT CHANGE UP (ref 0.5–1.3)
EGFR: 72 ML/MIN/1.73M2 — SIGNIFICANT CHANGE UP
EGFR: 72 ML/MIN/1.73M2 — SIGNIFICANT CHANGE UP
EGFR: 74 ML/MIN/1.73M2 — SIGNIFICANT CHANGE UP
EGFR: 74 ML/MIN/1.73M2 — SIGNIFICANT CHANGE UP
EOSINOPHIL # BLD AUTO: 0.07 K/UL — SIGNIFICANT CHANGE UP (ref 0–0.5)
EOSINOPHIL NFR BLD AUTO: 2.3 % — SIGNIFICANT CHANGE UP (ref 0–6)
GLUCOSE SERPL-MCNC: 101 MG/DL — HIGH (ref 70–99)
GLUCOSE SERPL-MCNC: 94 MG/DL — SIGNIFICANT CHANGE UP (ref 70–99)
HCT VFR BLD CALC: 39.3 % — SIGNIFICANT CHANGE UP (ref 39–50)
HCT VFR BLD CALC: 40.7 % — SIGNIFICANT CHANGE UP (ref 39–50)
HGB BLD-MCNC: 13.3 G/DL — SIGNIFICANT CHANGE UP (ref 13–17)
HGB BLD-MCNC: 13.9 G/DL — SIGNIFICANT CHANGE UP (ref 13–17)
IANC: 1.65 K/UL — LOW (ref 1.8–7.4)
IMM GRANULOCYTES NFR BLD AUTO: 0.3 % — SIGNIFICANT CHANGE UP (ref 0–0.9)
LYMPHOCYTES # BLD AUTO: 0.81 K/UL — LOW (ref 1–3.3)
LYMPHOCYTES # BLD AUTO: 26.5 % — SIGNIFICANT CHANGE UP (ref 13–44)
MAGNESIUM SERPL-MCNC: 2.2 MG/DL — SIGNIFICANT CHANGE UP (ref 1.6–2.6)
MAGNESIUM SERPL-MCNC: 2.2 MG/DL — SIGNIFICANT CHANGE UP (ref 1.6–2.6)
MCHC RBC-ENTMCNC: 28.8 PG — SIGNIFICANT CHANGE UP (ref 27–34)
MCHC RBC-ENTMCNC: 29.2 PG — SIGNIFICANT CHANGE UP (ref 27–34)
MCHC RBC-ENTMCNC: 33.8 G/DL — SIGNIFICANT CHANGE UP (ref 32–36)
MCHC RBC-ENTMCNC: 34.2 G/DL — SIGNIFICANT CHANGE UP (ref 32–36)
MCV RBC AUTO: 84.4 FL — SIGNIFICANT CHANGE UP (ref 80–100)
MCV RBC AUTO: 86.2 FL — SIGNIFICANT CHANGE UP (ref 80–100)
MONOCYTES # BLD AUTO: 0.48 K/UL — SIGNIFICANT CHANGE UP (ref 0–0.9)
MONOCYTES NFR BLD AUTO: 15.7 % — HIGH (ref 2–14)
NEUTROPHILS # BLD AUTO: 1.65 K/UL — LOW (ref 1.8–7.4)
NEUTROPHILS NFR BLD AUTO: 53.9 % — SIGNIFICANT CHANGE UP (ref 43–77)
NRBC # BLD AUTO: 0 K/UL — SIGNIFICANT CHANGE UP (ref 0–0)
NRBC # BLD AUTO: 0 K/UL — SIGNIFICANT CHANGE UP (ref 0–0)
NRBC # FLD: 0 K/UL — SIGNIFICANT CHANGE UP (ref 0–0)
NRBC # FLD: 0 K/UL — SIGNIFICANT CHANGE UP (ref 0–0)
NRBC BLD AUTO-RTO: 0 /100 WBCS — SIGNIFICANT CHANGE UP (ref 0–0)
NRBC BLD AUTO-RTO: 0 /100 WBCS — SIGNIFICANT CHANGE UP (ref 0–0)
PHOSPHATE SERPL-MCNC: 3.5 MG/DL — SIGNIFICANT CHANGE UP (ref 2.5–4.5)
PHOSPHATE SERPL-MCNC: 3.6 MG/DL — SIGNIFICANT CHANGE UP (ref 2.5–4.5)
PLATELET # BLD AUTO: 171 K/UL — SIGNIFICANT CHANGE UP (ref 150–400)
PLATELET # BLD AUTO: 171 K/UL — SIGNIFICANT CHANGE UP (ref 150–400)
POTASSIUM SERPL-MCNC: 4 MMOL/L — SIGNIFICANT CHANGE UP (ref 3.5–5.3)
POTASSIUM SERPL-MCNC: 4.2 MMOL/L — SIGNIFICANT CHANGE UP (ref 3.5–5.3)
POTASSIUM SERPL-SCNC: 4 MMOL/L — SIGNIFICANT CHANGE UP (ref 3.5–5.3)
POTASSIUM SERPL-SCNC: 4.2 MMOL/L — SIGNIFICANT CHANGE UP (ref 3.5–5.3)
PROT SERPL-MCNC: 6.2 G/DL — SIGNIFICANT CHANGE UP (ref 6–8.3)
RBC # BLD: 4.56 M/UL — SIGNIFICANT CHANGE UP (ref 4.2–5.8)
RBC # BLD: 4.82 M/UL — SIGNIFICANT CHANGE UP (ref 4.2–5.8)
RBC # FLD: 12.3 % — SIGNIFICANT CHANGE UP (ref 10.3–14.5)
RBC # FLD: 12.5 % — SIGNIFICANT CHANGE UP (ref 10.3–14.5)
SODIUM SERPL-SCNC: 136 MMOL/L — SIGNIFICANT CHANGE UP (ref 135–145)
SODIUM SERPL-SCNC: 137 MMOL/L — SIGNIFICANT CHANGE UP (ref 135–145)
WBC # BLD: 3.06 K/UL — LOW (ref 3.8–10.5)
WBC # BLD: 4.19 K/UL — SIGNIFICANT CHANGE UP (ref 3.8–10.5)
WBC # FLD AUTO: 3.06 K/UL — LOW (ref 3.8–10.5)
WBC # FLD AUTO: 4.19 K/UL — SIGNIFICANT CHANGE UP (ref 3.8–10.5)

## 2025-04-13 PROCEDURE — 99239 HOSP IP/OBS DSCHRG MGMT >30: CPT

## 2025-04-13 PROCEDURE — 93010 ELECTROCARDIOGRAM REPORT: CPT

## 2025-04-13 RX ORDER — AMLODIPINE BESYLATE 10 MG/1
10 TABLET ORAL DAILY
Refills: 0 | Status: DISCONTINUED | OUTPATIENT
Start: 2025-04-13 | End: 2025-04-13

## 2025-04-13 RX ORDER — KETOROLAC TROMETHAMINE 30 MG/ML
30 INJECTION, SOLUTION INTRAMUSCULAR; INTRAVENOUS ONCE
Refills: 0 | Status: DISCONTINUED | OUTPATIENT
Start: 2025-04-13 | End: 2025-04-13

## 2025-04-13 RX ORDER — ROSUVASTATIN CALCIUM 5 MG/1
1 TABLET, FILM COATED ORAL
Refills: 0 | DISCHARGE

## 2025-04-13 RX ORDER — CIPROFLOXACIN HCL 250 MG
1 TABLET ORAL
Qty: 24 | Refills: 0
Start: 2025-04-13 | End: 2025-04-24

## 2025-04-13 RX ORDER — LOSARTAN POTASSIUM 100 MG/1
50 TABLET, FILM COATED ORAL DAILY
Refills: 0 | Status: DISCONTINUED | OUTPATIENT
Start: 2025-04-13 | End: 2025-04-13

## 2025-04-13 RX ADMIN — ERTAPENEM SODIUM 100 MILLIGRAM(S): 1 INJECTION, POWDER, LYOPHILIZED, FOR SOLUTION INTRAMUSCULAR; INTRAVENOUS at 13:48

## 2025-04-13 RX ADMIN — POLYETHYLENE GLYCOL 3350 17 GRAM(S): 17 POWDER, FOR SOLUTION ORAL at 12:05

## 2025-04-13 RX ADMIN — AMLODIPINE BESYLATE 10 MILLIGRAM(S): 10 TABLET ORAL at 01:11

## 2025-04-13 RX ADMIN — LOSARTAN POTASSIUM 50 MILLIGRAM(S): 100 TABLET, FILM COATED ORAL at 01:11

## 2025-04-13 RX ADMIN — Medication 81 MILLIGRAM(S): at 12:05

## 2025-04-13 NOTE — PROGRESS NOTE ADULT - SUBJECTIVE AND OBJECTIVE BOX
Patient is a 74y old  Male who presents with a chief complaint of gram negative bacteremia (12 Apr 2025 15:55)      SUBJECTIVE / OVERNIGHT EVENTS: patient seen and examined by bedside, pt afebrile, reports mild headache , no further hematuria , dysuria resolved     MEDICATIONS  (STANDING):  amLODIPine   Tablet 10 milliGRAM(s) Oral daily  aspirin  chewable 81 milliGRAM(s) Oral daily  ertapenem  IVPB 1000 milliGRAM(s) IV Intermittent every 24 hours  influenza  Vaccine (HIGH DOSE) 0.5 milliLiter(s) IntraMuscular once  lactated ringers. 1000 milliLiter(s) (100 mL/Hr) IV Continuous <Continuous>  losartan 50 milliGRAM(s) Oral daily  polyethylene glycol 3350 17 Gram(s) Oral daily  senna 2 Tablet(s) Oral at bedtime    MEDICATIONS  (PRN):  acetaminophen     Tablet .. 650 milliGRAM(s) Oral every 6 hours PRN Temp greater or equal to 38C (100.4F), Mild Pain (1 - 3)  ondansetron Injectable 4 milliGRAM(s) IV Push once PRN Nausea and/or Vomiting      Vital Signs Last 24 Hrs  T(C): 37.3 (13 Apr 2025 10:00), Max: 37.3 (13 Apr 2025 10:00)  T(F): 99.1 (13 Apr 2025 10:00), Max: 99.1 (13 Apr 2025 10:00)  HR: 75 (13 Apr 2025 10:00) (55 - 75)  BP: 122/77 (13 Apr 2025 10:00) (122/77 - 158/72)  BP(mean): --  RR: 17 (13 Apr 2025 10:00) (17 - 18)  SpO2: 99% (13 Apr 2025 10:00) (98% - 100%)    Parameters below as of 13 Apr 2025 10:00  Patient On (Oxygen Delivery Method): room air      CAPILLARY BLOOD GLUCOSE        I&O's Summary    12 Apr 2025 07:01  -  13 Apr 2025 07:00  --------------------------------------------------------  IN: 250 mL / OUT: 0 mL / NET: 250 mL    13 Apr 2025 07:01  -  13 Apr 2025 13:11  --------------------------------------------------------  IN: 240 mL / OUT: 0 mL / NET: 240 mL        PHYSICAL EXAM:  GENERAL: NAD, well-developed  HEAD:  Atraumatic, Normocephalic  EYES: EOMI, PERRLA, conjunctiva and sclera clear  NECK: Supple, No JVD  CHEST/LUNG: Clear to auscultation bilaterally; No wheeze  HEART: Regular rate and rhythm; No murmurs, rubs, or gallops  ABDOMEN: Soft, Nontender, Nondistended; Bowel sounds present, no CVA tenderness   EXTREMITIES:  2+ Peripheral Pulses, No clubbing, cyanosis, or edema  PSYCH: AAOx3  NEUROLOGY: non-focal  SKIN: No rashes or lesions      LABS:                        13.3   3.06  )-----------( 171      ( 13 Apr 2025 07:06 )             39.3     04-13    137  |  103  |  11  ----------------------------<  94  4.2   |  21[L]  |  1.08    Ca    8.8      13 Apr 2025 07:06  Phos  3.6     04-13  Mg     2.20     04-13    TPro  6.2  /  Alb  3.5  /  TBili  0.5  /  DBili  <0.2  /  AST  88[H]  /  ALT  74[H]  /  AlkPhos  77  04-13          Urinalysis Basic - ( 13 Apr 2025 07:06 )    Color: x / Appearance: x / SG: x / pH: x  Gluc: 94 mg/dL / Ketone: x  / Bili: x / Urobili: x   Blood: x / Protein: x / Nitrite: x   Leuk Esterase: x / RBC: x / WBC x   Sq Epi: x / Non Sq Epi: x / Bacteria: x        RADIOLOGY & ADDITIONAL TESTS:    FINDINGS:  LOWER CHEST: Minimal bibasilar dependent atelectasis.    LIVER: Within normal limits.  BILE DUCTS: Normal caliber.  GALLBLADDER: Within normal limits.  SPLEEN: Within normal limits.  PANCREAS: Within normal limits.  ADRENALS: Within normal limits.  KIDNEYS/URETERS: Subcentimeter hypodense focus in the right kidney that   is too small to characterize. No hydronephrosis.    BLADDER: Underdistended. Mild perivesical fat infiltration.  REPRODUCTIVE ORGANS: Enlarged and heterogeneous prostate. No evidence of   intraprostatic abscess. Mild fat infiltration around the seminal vesicles.    BOWEL: No bowel obstruction. Appendix is normal. Mild perirectal fat   infiltration.  PERITONEUM/RETROPERITONEUM: Trace free peritoneal fluid in the right   greater than left lower quadrant and trace ill-defined presacral fluid.   No organized collection. No free air.  VESSELS: Within normal limits.  LYMPH NODES: No lymphadenopathy.  ABDOMINAL WALL: Within normal limits.  BONES: Degenerative changes.    IMPRESSION:  *  Mild fat infiltration around the rectum, seminal vesicles, and urinary   bladder, possibly secondary to postprocedural inflammation versus   infection, i.e. proctitis, seminal vesiculitis, or cystitis.  *  Trace free peritoneal fluid in the pelvis and trace ill-defined   presacral fluid. No organized intra-abdominal collection.    < end of copied text >    Imaging Personally Reviewed:    Consultant(s) Notes Reviewed:      Care Discussed with Consultants/Other Providers: urology

## 2025-04-13 NOTE — PROVIDER CONTACT NOTE (OTHER) - ASSESSMENT
/72, HR 75, Temp 98.8, O2 100, RR 18. Patient denies chest pain, dizziness. Complains of headache 6/10 and strain in left side of neck.

## 2025-04-13 NOTE — PROGRESS NOTE ADULT - PROBLEM SELECTOR PLAN 3
Cr elevated to 1.59 on admission; Feb 2025 outpatient labwork with Cr 1.15 (PCP at [facility], records on pt phone). S/p 2L NS in ED  - s/pIVF - LR @ 100cc/hr  - Monitor UOP  - cr trending down, Trend SCr  - avoid nephrotoxics

## 2025-04-13 NOTE — PROGRESS NOTE ADULT - ASSESSMENT
73 yo M HTN, DM, nephrolithiasis, transrectal biopsy with positive BCXs  Fever, no leukocytosis  Recent transrectal prostate biopsy  UA+ with RBCs as well  CXR clear  Culture data from OSH: E coli R Cefazolin, Ceftriaxone, Bactrim; S Zosyn, Ertapenem, Cipro/Levo  Here BCXs NGTD  Overall, GNR Bacteremia, fever  - Ertapenem 1g q 24, when DC planning can send out on Cipro 500mg q 12 to complete 14 days from negative BCX (assuming no change found on CT A/P, e.g. prostate abscess)  - F/U BCXs  - Would check CT A/P (with contrast if able to tolerate), to evaluate abd and prostate for source/abscess  - Trend fever course, monitor for further signs infection/focus    My colleagues will be covering this patient starting on 4/12/25, I will return 4/15. Please call [phone] or on call fellow with any questions or change in status.     Tung Blandon MD  Contact on TEAMS messaging from 9am - 5pm  From 5pm-9am, on weekends, or if no response call [phone]    Antibiotic decision making based on local antibiogram and available recent culture results
74M PMHx HTN and elevated PSA (4.2-4.5) s/p transrectal prostate Bx 3 days ago by private Urologist p/w vomiting and chills x2 day. Pt initially went to [facility] ED yesterday, CT A&P W unremarkable, Cr 1.52, UA only notable for small Leuk and NO nitrate. But then was contacted by [facility] ED to go back to ED due to BCx (+) GNR. AFVSS. Exam unremarkable. WBC 8.91. Hct 39.4. Cr 1.59. LA 1.9. UA moderate Leuk, (-) Nitrate, 110 wbc, 913 rbc. BCX and UCX PENDING. ED Tx Zosyn.     RECOMMENDATION:   - No acute urologic intervention  - continue abx  - f/u blood and urine cultures  - monitor outputs  - trend fevers   - No further inpatient urologic intervention please call urology if patient decompensates or with any further questions       Discussed with Dr. Viveros 
74M with PMH HTN, BPH, elevated PSA, pre-DM, nephrolithiasis p/w hematuria, dysuria iso recent transrectal prostate biopsy (4/7/25) with gram-negative bacteremia on 4/8/25 blood cultures (from [facility] ED), admitted to medicine for further management. Urology following
74M with PMH HTN, BPH, elevated PSA, pre-DM, nephrolithiasis p/w hematuria, dysuria iso recent transrectal prostate biopsy (4/7/25) with gram-negative bacteremia on 4/8/25 blood cultures (from [facility] ED), admitted to medicine for further management. Urology following
74M with PMH HTN, BPH, elevated PSA, pre-DM, nephrolithiasis p/w hematuria, dysuria iso recent transrectal prostate biopsy (4/7/25) with gram-negative bacteremia on 4/8/25 blood cultures (from [facility] ED), admitted to medicine for further management. Urology following
74M with PMH HTN, BPH, elevated PSA, pre-DM, nephrolithiasis p/w hematuria, dysuria iso recent transrectal prostate biopsy (4/7/25) with gram-negative bacteremia on 4/8/25 blood cultures (from [facility] ED), admitted to medicine for further management. Urology following

## 2025-04-13 NOTE — DISCHARGE NOTE NURSING/CASE MANAGEMENT/SOCIAL WORK - PATIENT PORTAL LINK FT
You can access the FollowMyHealth Patient Portal offered by [facility] by registering at the following website: [email or web address]. By joining [facility]’s FollowMyHealth portal, you will also be able to view your health information using other applications (apps) compatible with our system.

## 2025-04-13 NOTE — PROVIDER CONTACT NOTE (OTHER) - SITUATION
Temp 102.2
Patient complaining of headache and strain in neck. Patient states he has not been taking his home medications for HTN and believes elevated BP is the reason for his headache.

## 2025-04-13 NOTE — DISCHARGE NOTE NURSING/CASE MANAGEMENT/SOCIAL WORK - FINANCIAL ASSISTANCE
[facility] provides services at a reduced cost to those who are determined to be eligible through [facility]’s financial assistance program. Information regarding [facility]’s financial assistance program can be found by going to [email or web address] or by calling [phone].

## 2025-04-13 NOTE — PROGRESS NOTE ADULT - PROBLEM SELECTOR PLAN 4
S/p transrectal biopsy 4/7 for elevated PSA w/ family history (brother) of prostate cancer    - F/u biopsy results outpatient   - Urology following, appreciate further recommendations
S/p transrectal biopsy 4/7 for elevated PSA w/ family history (brother) of prostate cancer    - F/u biopsy results outpatient   - Urology following, appreciate further recommendations   case discussed , ok with dc planning
S/p transrectal biopsy 4/7 for elevated PSA w/ family history (brother) of prostate cancer    - F/u biopsy results outpatient   - Urology following, appreciate further recommendations
S/p transrectal biopsy 4/7 for elevated PSA w/ family history (brother) of prostate cancer    - F/u biopsy results outpatient   - Urology following, appreciate further recommendations

## 2025-04-13 NOTE — PROGRESS NOTE ADULT - PROBLEM SELECTOR PLAN 2
Reporting onset of hematuria since transrectal biopsy, likely iso procedure. H/H 13/39.4 on admission, hemodynamically stable  now resolved   - Monitor urine   - Trend CBC, H/H  stable   - On home aspirin (unclear indication), no other blood thinners - will c/w ASA for now

## 2025-04-13 NOTE — PROGRESS NOTE ADULT - PROBLEM SELECTOR PLAN 7
DVT ppx - hold chemical ppx iso hematuria, OOB   Diet - DASH, no lactose/dairy, no eggs, no meat  Dispo - WillDC home today   Patient hemodynamically stable for discharge home  Time spent in discharge process is 35 min    plan of care d/w pt and wife  at bedside     case d/w ACP

## 2025-04-13 NOTE — PROGRESS NOTE ADULT - PROBLEM SELECTOR PLAN 1
Presenting as callback after [facility] blood cultures showed gram-negative bacteremia iso recent transrectal prostate biopsy (4/7/25, Dr. Tung Gresham), on Bactrim/Cefdinir pre- and post-procedure. ED visit to [facility] 4/8 for nausea/vomiting/chills- at [facility] CT scan of abdomen/pelvis read as no acute findings, no abscess/hematoma, mild stranding compatible with recent biopsy. Endorsing hematuria, mild dysuria following biopsy; suspect  source given history. On admission Tmax 102.8, WBC wnl; s/p Zosyn x1 in ED  pt admitted with sepsis and bacteremia   - on  Zosyn 3.375g q12 (dose adjusted for RHIANNON),  Blood cx with E coli , ID rec to change abx to Ertapenem and can be discharged on PO Cipro BID to complete 14 days course if CT scan negative for abscess . CT scan noted as above ,  Mild fat infiltration around the rectum, seminal vesicles, and urinary bladder, possibly secondary to postprocedural inflammation versus  infection, i.e. proctitis, seminal vesiculitis, or cystitis.  - F/u repeat blood cultures- NGTD    - ID rec - Ertapenem 1g q 24, when DC planning can send out on Cipro 500mg q 12 to complete 14 days from negative BCX (assuming no change found on CT A/P, e.g. prostate abscess    - UA w/ mod LE/blood, f/u urine culture also with no growth  - Urology following, appreciate recs  - Tylenol prn for fever  -ID eval requested for  abx management , recs appreciated

## 2025-04-13 NOTE — PROVIDER CONTACT NOTE (OTHER) - RECOMMENDATIONS
Notify provider. Patient requests BP medications used at home. EKG recommended.
prn tylenol given, provider made aware

## 2025-04-13 NOTE — PROGRESS NOTE ADULT - PROBLEM SELECTOR PLAN 6
- C/w Crestor 5mg qhs (home med)  LFTs mildly elevated , may be secondary to sepsis    hold Crestor   recommend repeat labs with PMD for LFT, can resume crestor if LFT wnl
- C/w Crestor 5mg qhs (home med)  LFTs mildly elevated , may be secondary to sepsis    will CTM, if worsening will hold statin
- C/w Crestor 5mg qhs (home med)
- C/w Crestor 5mg qhs (home med)  LFTs mildly elevated , may be secondary to sepsis    will CTM, if worsening will hold statin

## 2025-04-13 NOTE — PROVIDER CONTACT NOTE (OTHER) - ACTION/TREATMENT ORDERED:
Provider made aware. Ordered BP medications, administered to patient. EKG done and sent to provider. Safety maintained. Continue to monitor.
provider notified, will recheck temp

## 2025-04-13 NOTE — PROGRESS NOTE ADULT - PROBLEM SELECTOR PLAN 5
Home medications: Losartan 50mg qd, Norvasc 10mg qd    - Hold Losartan iso RHIANNON  - Hold Norvasc, resume as tolerated
Home medications: Losartan 50mg qd, Norvasc 10mg qd    - Hold Losartan iso RHIANNON  - Hold Norvasc, resume as tolerated
Home medications: Losartan 50mg qd, Norvasc 10mg qd    - Holding  Losartan iso RHIANNON  - Norvasc was resumed , can resume Losartan as RHIANNON resolved
Home medications: Losartan 50mg qd, Norvasc 10mg qd    - Hold Losartan iso RHIANNON  - Hold Norvasc, resume as tolerated

## 2025-04-13 NOTE — PROGRESS NOTE ADULT - PROBLEM SELECTOR PROBLEM 3
RHIANNON (acute kidney injury)

## 2025-04-13 NOTE — DISCHARGE NOTE NURSING/CASE MANAGEMENT/SOCIAL WORK - NSDCPEFALRISK_GEN_ALL_CORE
For information on Fall & Injury Prevention, visit: [email or web address] OR  [email or web address] OR  https://www.cdc.gov/steadi/patient.html

## 2025-04-14 PROBLEM — Z87.438 PERSONAL HISTORY OF OTHER DISEASES OF MALE GENITAL ORGANS: Chronic | Status: ACTIVE | Noted: 2025-04-09

## 2025-04-14 PROBLEM — E78.5 HYPERLIPIDEMIA, UNSPECIFIED: Chronic | Status: ACTIVE | Noted: 2025-04-09

## 2025-04-14 PROBLEM — I10 ESSENTIAL (PRIMARY) HYPERTENSION: Chronic | Status: ACTIVE | Noted: 2025-04-09

## 2025-04-14 PROBLEM — Z87.898 PERSONAL HISTORY OF OTHER SPECIFIED CONDITIONS: Chronic | Status: ACTIVE | Noted: 2025-04-09

## 2025-04-14 LAB
CULTURE RESULTS: SIGNIFICANT CHANGE UP
CULTURE RESULTS: SIGNIFICANT CHANGE UP
SPECIMEN SOURCE: SIGNIFICANT CHANGE UP
SPECIMEN SOURCE: SIGNIFICANT CHANGE UP

## 2025-04-21 ENCOUNTER — NON-APPOINTMENT (OUTPATIENT)
Age: 75
End: 2025-04-21

## 2025-04-22 ENCOUNTER — APPOINTMENT (OUTPATIENT)
Dept: INFECTIOUS DISEASE | Facility: CLINIC | Age: 75
End: 2025-04-22

## 2025-04-22 DIAGNOSIS — Z71.84 ENC FOR HEALTH COUNSELING RELATED TO TRAVEL: ICD-10-CM

## 2025-04-22 PROCEDURE — 90471 IMMUNIZATION ADMIN: CPT | Mod: NC

## 2025-04-22 PROCEDURE — 99401 PREV MED CNSL INDIV APPRX 15: CPT

## 2025-04-22 PROCEDURE — 90717 YELLOW FEVER VACCINE SUBQ: CPT

## 2025-04-22 RX ORDER — AZITHROMYCIN 500 MG/1
500 TABLET, FILM COATED ORAL
Qty: 3 | Refills: 0 | Status: ACTIVE | COMMUNITY
Start: 2025-04-22 | End: 1900-01-01

## 2025-04-24 DIAGNOSIS — C61 MALIGNANT NEOPLASM OF PROSTATE: ICD-10-CM

## 2025-05-02 ENCOUNTER — NON-APPOINTMENT (OUTPATIENT)
Age: 75
End: 2025-05-02

## 2025-05-08 ENCOUNTER — NON-APPOINTMENT (OUTPATIENT)
Age: 75
End: 2025-05-08

## 2025-05-08 ENCOUNTER — APPOINTMENT (OUTPATIENT)
Dept: UROLOGY | Facility: CLINIC | Age: 75
End: 2025-05-08
Payer: MEDICARE

## 2025-05-08 VITALS
BODY MASS INDEX: 24.38 KG/M2 | DIASTOLIC BLOOD PRESSURE: 71 MMHG | TEMPERATURE: 99.5 F | WEIGHT: 180 LBS | HEART RATE: 78 BPM | SYSTOLIC BLOOD PRESSURE: 144 MMHG | OXYGEN SATURATION: 98 % | RESPIRATION RATE: 16 BRPM | HEIGHT: 72 IN

## 2025-05-08 DIAGNOSIS — C61 MALIGNANT NEOPLASM OF PROSTATE: ICD-10-CM

## 2025-05-08 PROCEDURE — 99204 OFFICE O/P NEW MOD 45 MIN: CPT

## 2025-05-08 PROCEDURE — G2211 COMPLEX E/M VISIT ADD ON: CPT

## 2025-05-08 RX ORDER — LOSARTAN POTASSIUM 100 MG/1
TABLET, FILM COATED ORAL
Refills: 0 | Status: ACTIVE | COMMUNITY

## 2025-05-08 RX ORDER — AMLODIPINE BESYLATE 5 MG/1
TABLET ORAL
Refills: 0 | Status: ACTIVE | COMMUNITY

## 2025-05-08 RX ORDER — ROSUVASTATIN CALCIUM 5 MG/1
TABLET, FILM COATED ORAL
Refills: 0 | Status: ACTIVE | COMMUNITY

## 2025-06-12 ENCOUNTER — APPOINTMENT (OUTPATIENT)
Dept: INFECTIOUS DISEASE | Facility: CLINIC | Age: 75
End: 2025-06-12
Payer: MEDICARE

## 2025-06-12 PROBLEM — R78.81 BACTEREMIA: Status: ACTIVE | Noted: 2025-06-12

## 2025-06-12 PROCEDURE — 99214 OFFICE O/P EST MOD 30 MIN: CPT

## 2025-07-28 ENCOUNTER — NON-APPOINTMENT (OUTPATIENT)
Age: 75
End: 2025-07-28

## 2025-07-28 DIAGNOSIS — Z00.00 ENCOUNTER FOR GENERAL ADULT MEDICAL EXAMINATION W/OUT ABNORMAL FINDINGS: ICD-10-CM

## 2025-07-28 DIAGNOSIS — Z77.098 CONTACT WITH AND (SUSPECTED) EXPOSURE TO OTHER HAZARDOUS, CHIEFLY NONMEDICINAL, CHEMICALS: ICD-10-CM

## 2025-08-07 ENCOUNTER — RESULT CHARGE (OUTPATIENT)
Age: 75
End: 2025-08-07

## 2025-08-07 ENCOUNTER — APPOINTMENT (OUTPATIENT)
Dept: PULMONOLOGY | Facility: CLINIC | Age: 75
End: 2025-08-07
Payer: COMMERCIAL

## 2025-08-07 ENCOUNTER — NON-APPOINTMENT (OUTPATIENT)
Age: 75
End: 2025-08-07

## 2025-08-07 VITALS — DIASTOLIC BLOOD PRESSURE: 72 MMHG | SYSTOLIC BLOOD PRESSURE: 139 MMHG | HEART RATE: 85 BPM | OXYGEN SATURATION: 93 %

## 2025-08-07 DIAGNOSIS — Z13.9 ENCOUNTER FOR SCREENING, UNSPECIFIED: ICD-10-CM

## 2025-08-07 DIAGNOSIS — C61 MALIGNANT NEOPLASM OF PROSTATE: ICD-10-CM

## 2025-08-07 LAB — POCT - HEMOGLOBIN (HGB), QUANTITATIVE, TRANSCUTANEOUS: 11.8

## 2025-08-07 PROCEDURE — 36415 COLL VENOUS BLD VENIPUNCTURE: CPT

## 2025-08-07 PROCEDURE — 88738 HGB QUANT TRANSCUTANEOUS: CPT

## 2025-08-07 PROCEDURE — 94729 DIFFUSING CAPACITY: CPT

## 2025-08-07 PROCEDURE — 94727 GAS DIL/WSHOT DETER LNG VOL: CPT

## 2025-08-07 PROCEDURE — 93000 ELECTROCARDIOGRAM COMPLETE: CPT

## 2025-08-07 PROCEDURE — 94060 EVALUATION OF WHEEZING: CPT

## 2025-08-07 PROCEDURE — 81003 URINALYSIS AUTO W/O SCOPE: CPT | Mod: QW

## 2025-08-07 PROCEDURE — ZZZZZ: CPT

## 2025-08-07 PROCEDURE — 71046 X-RAY EXAM CHEST 2 VIEWS: CPT

## 2025-08-07 PROCEDURE — 99205 OFFICE O/P NEW HI 60 MIN: CPT | Mod: 25

## 2025-08-08 ENCOUNTER — NON-APPOINTMENT (OUTPATIENT)
Age: 75
End: 2025-08-08

## 2025-08-08 LAB
ALBUMIN SERPL ELPH-MCNC: 4.5 G/DL
ALP BLD-CCNC: 81 U/L
ALT SERPL-CCNC: 13 U/L
ANION GAP SERPL CALC-SCNC: 13 MMOL/L
AST SERPL-CCNC: 20 U/L
BASOPHILS # BLD AUTO: 0.08 K/UL
BASOPHILS NFR BLD AUTO: 1.7 %
BILIRUB SERPL-MCNC: 0.4 MG/DL
BUN SERPL-MCNC: 14 MG/DL
CALCIUM SERPL-MCNC: 9.8 MG/DL
CHLORIDE SERPL-SCNC: 107 MMOL/L
CHOLEST SERPL-MCNC: 146 MG/DL
CO2 SERPL-SCNC: 23 MMOL/L
CREAT SERPL-MCNC: 1.04 MG/DL
EGFRCR SERPLBLD CKD-EPI 2021: 76 ML/MIN/1.73M2
EOSINOPHIL # BLD AUTO: 0.26 K/UL
EOSINOPHIL NFR BLD AUTO: 5.4 %
GLUCOSE SERPL-MCNC: 117 MG/DL
HCT VFR BLD CALC: 40.8 %
HDLC SERPL-MCNC: 71 MG/DL
HGB BLD-MCNC: 13.5 G/DL
IMM GRANULOCYTES NFR BLD AUTO: 0.2 %
LDLC SERPL-MCNC: 64 MG/DL
LYMPHOCYTES # BLD AUTO: 1.94 K/UL
LYMPHOCYTES NFR BLD AUTO: 40.5 %
MAN DIFF?: NORMAL
MCHC RBC-ENTMCNC: 29.3 PG
MCHC RBC-ENTMCNC: 33.1 G/DL
MCV RBC AUTO: 88.7 FL
MONOCYTES # BLD AUTO: 0.49 K/UL
MONOCYTES NFR BLD AUTO: 10.2 %
NEUTROPHILS # BLD AUTO: 2.01 K/UL
NEUTROPHILS NFR BLD AUTO: 42 %
NONHDLC SERPL-MCNC: 75 MG/DL
PLATELET # BLD AUTO: 212 K/UL
POTASSIUM SERPL-SCNC: 3.7 MMOL/L
PROT SERPL-MCNC: 6.8 G/DL
RBC # BLD: 4.6 M/UL
RBC # FLD: 13.1 %
SODIUM SERPL-SCNC: 142 MMOL/L
TRIGL SERPL-MCNC: 49 MG/DL
WBC # FLD AUTO: 4.79 K/UL